# Patient Record
Sex: MALE | HISPANIC OR LATINO | Employment: OTHER | URBAN - METROPOLITAN AREA
[De-identification: names, ages, dates, MRNs, and addresses within clinical notes are randomized per-mention and may not be internally consistent; named-entity substitution may affect disease eponyms.]

---

## 2017-02-01 ENCOUNTER — CLINICAL SUPPORT (OUTPATIENT)
Dept: SPEECH THERAPY | Facility: HOSPITAL | Age: 32
End: 2017-02-01
Attending: ORTHOPAEDIC SURGERY
Payer: MEDICAID

## 2017-02-01 DIAGNOSIS — R53.1 WEAKNESS: ICD-10-CM

## 2017-02-01 DIAGNOSIS — M79.602 PAIN OF LEFT UPPER EXTREMITY: ICD-10-CM

## 2017-02-01 PROCEDURE — 97165 OT EVAL LOW COMPLEX 30 MIN: CPT

## 2017-02-01 NOTE — PLAN OF CARE
TIME RECORD    Date: 2017    Start Time:  300  Stop Time:  400    PROCEDURES:    TIMED  Procedure Min.                 UNTIMED  Procedure Min.   IE 60         Total Timed Minutes:  -  Total Timed Units:  -  Total Untimed Units:  1  Charges Billed/# of units:  LCE1    OCCUPATIONAL THERAPY INITIAL EVALUATION & PLAN OF TREATMENT    Patient Name: Alfie Holly  Physician Name:  Collins Dow  Primary Diagnosis:  L shoulder dislocation  Treatment Diagnosis:  Pain in limb, weakness, instability  Onset Date:  1 month ago  Eval Date:  2017  Certification Period:  2017 to 2017  Past Medical History: No past medical history on file.  Precautions:  universal  Prior Therapy:  None for shoulder  Signs of Abuse: no  Medications: Alfie Holly currently has no medications in their medication list.  Nutrition:  Good  Social Cultural Assessment:  Doesn't work  Prior Level of Function: Independent  Social History:  Lives with Dad  Place of Residence (steps/adaptations):  N/A  Functional Deficits Leading to Referral/Nature of Injury:  Pt reports h/o multiple dislocations LUE however the least time he was reaching overhead to pull shirt off to take a shower and dislocated L shoulder and couldn't get it to go back in had to go to the ER; Pt states he thinks this started when he was younger and he was bocing and he went to hit with is LUE and felt a pop. With increased incidence of dislocation after that.    Patient Therapy Goals:  To decrease pain and increase functional use LUE  Hand dominance: Right  X-Rays/Tests: There is left anterior glenohumeral dislocation which appears reduced on final transscapular Y-view. Hill-Sachs lesion noted. Acromioclavicular joint is maintained.    Subjective:  Pain:  During no work: 0/10  While workin/10  Sleepin/10  Location of pain:anterior shoulder, Biceps tendon    Objective:  Sensation Test: Patient denies any  numbness/tingling    Observation/Inspection:normal muscle tone for age    Range of Motion:   Shoulder  Right   Left  Pain/Dysfunction with Movement    AROM PROM MMT AROM PROM MMT    flexion WNL WNL  160 5/5 *pain   extension WNL WNL WNL 65 WNL 5/5    abduction WNL WNL  160 5/5 *pain   adduction WNL WNL WNL 45 N/T 5/5 *pain   Internal rotation WNL WNL WNL L4 85 4+/5 *pain   ER at 90° abd WNL WNL WNL 70 25 N/T *pain/guarded during PROM   ER at 0° abd WNL WNL WNL 50 30 5/5    Elbow flex WNL WNL WNL WNL WNL 5/5      ROM Comments: Pain at end range    Painful Arc: Patient demonstrates no painful arc in shoulder flexion or abduction    Special Tests:  Positive: Int. Rot. and ADD. Impingement and Bicep/yergason's Test  Negative: Empty can test    Palpation: (for pain)     Positive: Anterior Subacromial Space and Bicipital Groove    Limitations of Functional Status:   Self Care: requires increase time to don clothes and reaching overhead  Work: N/A  Leisure: unable to fish    Test: Patient scored 37% on fOTO shoulder survey demonstrating Pt's functional ability with upper extremity.     Treatment included: OT evaluation, the following exercises (HEP) were instructed and Alfie was able to demonstrate them prior to the end of the session. HEP are as follows: see attached in media     Assessment  This 31 y.o. male referred to Outpatient Occupational Therapy with diagnosis of   Encounter Diagnoses   Name Primary?    Pain of left upper extremity     Weakness     presents with limitations as described in problem list. Patient can benefit from Occupational Therapy services for Ultrasound, moist heat, PROM, AAROM, AROM, Theraputic exercises, joint mobs, home exercise program provied with written instructions, ice and strengthening. The following goals were discussed with the patient and he is in agreement with them as to be addressed in the treatment plan.     History Examination Decision Making Complexity Score    Occupational Profile: Did an brief chart review      Pt is a 32 yo male who lives with Dad, does not work, drives    Medical and Therapy History:     Comorbidities that may affect POC include: None noted          Low   Performance Deficits   Nondominant UE affected    Physical  Decreased function of Left UE, Decreased ROM, Increased pain, Decreased strength, Hypomobility, Inability to perform leisure activiites and Inability to perform self care tasks    Cognitive  N/A      Psychosocial:    Pt unable to perform the following requires increase time to don clothes and reaching overhead, unable to fish all of which were a part of pt's habits, roles, routines, interpersonal interactions prior to injury/surgey     Moderate Foto score:37%    Pt has several treatment options including ASTYM, IASTM, cupping, soft tissue mobs, joint mobs, therex, therapeutic activity, education, endurance training, modalities etc.      Discussed goals and Pt in agreement with goals.    Issued HEP at eval and pt able to perform all with minimal verbal and tactile cues provided by OT. Pt able to complete all without c/o and demonstrating good technique    Low Low     Rehab Potential: good    Goals to be met in 4 weeks: (3/2/17)  1) Initiate Hep   2) Pt will increase L shoulder AROM by 10 degrees grossly for improved performance with overhead ADL's  3) Pt will report 1/10 pain in (L)shoulder at worst  4) Pt will demonstrate increased MMT to 5/5 grossly L shoulder  5) Patient will be able to achieve less than or equal to 25% demonstrating overall improved functional ability with upper extremity.     Goals to be met by discharge:  1) Independent with HEP  2) Pt will demonstrate (L) shoulder AROM WNL grossly for [place] with ADL's  3) Pt will demonstrate (L) shoulder MMT WNL grossly for [place] with functional activities  4) Independent and pain free with ADL's and IADL's  5) Patient will be able to achieve less than or equal to 19%  demonstrating overall improved functional ability with upper extremity.     Plan  Recommended Treatment Plan (2 times per week for 8 weeks): Therapeutic Exercise, Functional Activities, Patient Education, Home Exercise Program, Ultrasound/Phonophoresis, Electrical Stimulation/TENS/Interferential, Moist Heat/Ice and Manual Therapy  Other Recommendations:  KT/ASTYM/cupping focus of stabilization therex.     Therapist's Name: Angy Mary CAMPBELL/RYLEY   Date: 02/01/2017    I CERTIFY THE NEED FOR THESE SERVICES FURNISHED UNDER THIS PLAN OF TREATMENT AND WHILE UNDER MY CARE    Physician's comments: ________________________________________________________________________________________________________________________________________________      Physician's Name: ___________________________________    There were no vitals taken for this visit.    I certify the need for these services furnished under this plan of treatment and while under my care.  Collins Dow MD

## 2017-02-06 ENCOUNTER — HOSPITAL ENCOUNTER (EMERGENCY)
Facility: HOSPITAL | Age: 32
Discharge: HOME | End: 2017-02-06
Attending: EMERGENCY MEDICINE
Payer: MEDICAID

## 2017-02-06 ENCOUNTER — CLINICAL SUPPORT (OUTPATIENT)
Dept: SPEECH THERAPY | Facility: HOSPITAL | Age: 32
End: 2017-02-06
Attending: ORTHOPAEDIC SURGERY
Payer: MEDICAID

## 2017-02-06 VITALS
HEIGHT: 67 IN | RESPIRATION RATE: 18 BRPM | OXYGEN SATURATION: 100 % | HEART RATE: 86 BPM | TEMPERATURE: 98 F | WEIGHT: 150 LBS | DIASTOLIC BLOOD PRESSURE: 96 MMHG | SYSTOLIC BLOOD PRESSURE: 139 MMHG | BODY MASS INDEX: 23.54 KG/M2

## 2017-02-06 DIAGNOSIS — R53.1 WEAKNESS: ICD-10-CM

## 2017-02-06 DIAGNOSIS — M79.602 PAIN OF LEFT UPPER EXTREMITY: ICD-10-CM

## 2017-02-06 DIAGNOSIS — J32.1 FRONTAL SINUSITIS, UNSPECIFIED CHRONICITY: Primary | ICD-10-CM

## 2017-02-06 PROCEDURE — 93005 ELECTROCARDIOGRAM TRACING: CPT

## 2017-02-06 PROCEDURE — 99283 EMERGENCY DEPT VISIT LOW MDM: CPT

## 2017-02-06 PROCEDURE — 93010 ELECTROCARDIOGRAM REPORT: CPT | Mod: ,,, | Performed by: INTERNAL MEDICINE

## 2017-02-06 PROCEDURE — 25000003 PHARM REV CODE 250: Performed by: EMERGENCY MEDICINE

## 2017-02-06 RX ORDER — IBUPROFEN 400 MG/1
800 TABLET ORAL
Status: COMPLETED | OUTPATIENT
Start: 2017-02-06 | End: 2017-02-06

## 2017-02-06 RX ORDER — AMOXICILLIN AND CLAVULANATE POTASSIUM 875; 125 MG/1; MG/1
1 TABLET, FILM COATED ORAL 2 TIMES DAILY
Qty: 14 TABLET | Refills: 0 | Status: SHIPPED | OUTPATIENT
Start: 2017-02-06 | End: 2017-02-16

## 2017-02-06 RX ORDER — LORATADINE 10 MG/1
10 TABLET ORAL DAILY
Qty: 60 TABLET | Refills: 0 | Status: SHIPPED | OUTPATIENT
Start: 2017-02-06 | End: 2018-02-06

## 2017-02-06 RX ORDER — IBUPROFEN 800 MG/1
800 TABLET ORAL EVERY 6 HOURS PRN
Qty: 20 TABLET | Refills: 0 | Status: SHIPPED | OUTPATIENT
Start: 2017-02-06 | End: 2017-10-21

## 2017-02-06 RX ORDER — AMOXICILLIN AND CLAVULANATE POTASSIUM 875; 125 MG/1; MG/1
1 TABLET, FILM COATED ORAL
Status: COMPLETED | OUTPATIENT
Start: 2017-02-06 | End: 2017-02-06

## 2017-02-06 RX ORDER — FLUTICASONE PROPIONATE 50 MCG
2 SPRAY, SUSPENSION (ML) NASAL 2 TIMES DAILY PRN
Qty: 15 G | Refills: 0 | Status: SHIPPED | OUTPATIENT
Start: 2017-02-06

## 2017-02-06 RX ADMIN — AMOXICILLIN AND CLAVULANATE POTASSIUM 1 TABLET: 875; 125 TABLET, FILM COATED ORAL at 07:02

## 2017-02-06 RX ADMIN — IBUPROFEN 800 MG: 400 TABLET, FILM COATED ORAL at 07:02

## 2017-02-06 NOTE — PROGRESS NOTES
Pt on UBE for/rev @120 x 6 min prior to session. After UBE pt stated his BP was running high earlier and asked therapist to take BP. BP taken initially with pt laying on mat: 152/87. Allowed pt to rest for 5 minutes and took BP again this time with pt sitting up: 138/94. LPN Ema Huntley and PTA Wild Neves present as well. Held off on therapy this date secondary to high blood pressure. Instructed pt to follow up with MD regarding high BP and detailed instructions provided in [place] by Wild Neves explaining any symptoms that would require pt to go to ED. Pt verbalized understanding. No charges indicated this date.

## 2017-02-07 DIAGNOSIS — R51.9 HEADACHE: Primary | ICD-10-CM

## 2017-02-07 NOTE — DISCHARGE INSTRUCTIONS
Understanding Sinus Problems    You dont often think about your sinuses until theres a problem. One day you realize you cant smell dinner cooking. Or you find you often have headaches or problems breathing through your nose.  Symptoms of sinus problems  Sinus problems can cause uncomfortable symptoms. Your nose may run constantly. You might have trouble sleeping at night. You may even lose your sense of smell. Other symptoms can include:  · Nasal congestion  · Fullness in ears  · Green, yellow, or bloody drainage from the nose  · Trouble tasting food  · Frequent headaches  · Facial pain  · Cough  When sinuses are blocked  If something blocks the passages in the nose or sinuses, mucus cant drain. Mucus-filled sinuses often become infected.  · Colds cause the lining of the nose and sinuses to swell and make extra mucus. A buildup of mucus can lead to a more serious infection.  · Allergies irritate turbinates and other tissues. This causes swelling, which can cause a blockage. Over time, this irritation can also lead to sacs of swollen tissue (polyps).  · Polyps may form in both the sinuses and nose. Polyps can grow large enough to clog nasal passages and block drainage.  · A crooked (deviated) septum may block nasal passages. This is often the result of an injury.  Date Last Reviewed: 11/1/2016  © 5803-2104 [employer]. [address] [place] [address], [place] [address]. All rights reserved. This information is not intended as a substitute for professional medical care. Always follow your healthcare professional's instructions.          Understanding Nasal Anatomy: Inside View  A lot happens under the surface of the nose. The bone and cartilage under the skin give the nose most of its size and shape. Other structures inside and behind the nose help you breathe. Learning the anatomy of the nose can help you better understand how the nose works.       Bone supports the upper third (bridge) of the nose. The  "upper cartilage supports the side of the nose. The lower cartilage adds support, width, and height. It helps shape the nostrils and the tip of the nose. Skin also helps shape the nose.          The nasal cavity is a hollow space behind the nose that air flows through. The septum is a thin "wall" made of cartilage and bone. It divides the inside of the nose into two chambers. The mucous membrane is thin tissue that lines the nose, sinuses, and throat. It warms and moistens the air you breathe in. It also makes the sticky mucus that helps clean the air of dust and other small particles. The turbinates on each side of the nose are curved, bony ridges lined with mucous membrane. They warm and moisten the air you breathe in. The sinuses are hollow, air-filled chambers in the bone around your nose. Mucus from the sinuses drains into the nasal cavity.  Date Last Reviewed: 11/1/2016  © 7397-3025 [employer]. [address] [place] [address], [place] [address]. All rights reserved. This information is not intended as a substitute for professional medical care. Always follow your healthcare professional's instructions.        "

## 2017-02-07 NOTE — ED TRIAGE NOTES
PT WENT TO DENTIST TODAY TOLD HE HAD HIGH BLOOD PRESSURE, BOTTOM NUMBER TOO HIGH, SO PT CAME TO ER.DENIES CHEST PAIN OR SOB. NO NECK OR JAW PAIN, NO LT ARM PAIN. DENIES VISUAL DISTURBANCE.

## 2017-02-07 NOTE — ED PROVIDER NOTES
Encounter Date: 2/6/2017       History     Chief Complaint   Patient presents with    Headache     headache and dizziness since yesterday     Review of patient's allergies indicates:  No Known Allergies  HPI Comments: Patient comes with one day of frontal sinus pressure, congestion and sore throat. No neck pain or ear pain. No fevers.     The history is provided by the patient.     History reviewed. No pertinent past medical history.  No past medical history pertinent negatives.  History reviewed. No pertinent past surgical history.  History reviewed. No pertinent family history.  Social History   Substance Use Topics    Smoking status: Never Smoker    Smokeless tobacco: None    Alcohol use No     Review of Systems   Constitutional: Negative for chills, fatigue and fever.   HENT: Positive for congestion and postnasal drip. Negative for sore throat.    Respiratory: Negative for cough, choking, chest tightness and shortness of breath.    Cardiovascular: Negative for chest pain, palpitations and leg swelling.   Gastrointestinal: Negative for abdominal distention, abdominal pain, diarrhea, nausea and vomiting.   Genitourinary: Negative for dysuria.   Musculoskeletal: Negative for back pain, neck pain and neck stiffness.   Skin: Negative for rash.   Neurological: Positive for headaches. Negative for weakness.   Hematological: Does not bruise/bleed easily.   All other systems reviewed and are negative.      Physical Exam   Initial Vitals   BP Pulse Resp Temp SpO2   02/06/17 1701 02/06/17 1701 02/06/17 1701 02/06/17 1701 02/06/17 1701   150/90 86 18 97.6 °F (36.4 °C) 96 %     Physical Exam    Nursing note and vitals reviewed.  Constitutional: Vital signs are normal. He appears well-developed and well-nourished. No distress.   HENT:   Head: Normocephalic and atraumatic.   Bilateral TM erythematous, OP erythematous without edema or exudate.    Eyes: EOM are normal. Pupils are equal, round, and reactive to light.   Neck:  Normal range of motion. Neck supple.   Cardiovascular: Normal rate and regular rhythm.   Pulmonary/Chest: Breath sounds normal. No respiratory distress. He has no wheezes. He has no rhonchi. He has no rales. He exhibits no tenderness.   Abdominal: Soft. He exhibits no distension. There is no tenderness. There is no rebound and no guarding.   Musculoskeletal: Normal range of motion. He exhibits no tenderness.   Neurological: He is alert and oriented to person, place, and time. No cranial nerve deficit.   Skin: Skin is warm and dry.   Psychiatric: He has a normal mood and affect.         ED Course   Procedures  Labs Reviewed - No data to display                            ED Course     Clinical Impression:   The encounter diagnosis was Frontal sinusitis, unspecified chronicity.    Disposition:   Disposition: Discharged  Condition: Stable       Aryan Gonzales MD  02/07/17 0022

## 2017-02-08 ENCOUNTER — CLINICAL SUPPORT (OUTPATIENT)
Dept: SPEECH THERAPY | Facility: HOSPITAL | Age: 32
End: 2017-02-08
Attending: ORTHOPAEDIC SURGERY
Payer: MEDICAID

## 2017-02-08 DIAGNOSIS — R53.1 WEAKNESS: ICD-10-CM

## 2017-02-08 DIAGNOSIS — M79.602 PAIN OF LEFT UPPER EXTREMITY: ICD-10-CM

## 2017-02-08 PROCEDURE — 97530 THERAPEUTIC ACTIVITIES: CPT

## 2017-02-08 NOTE — PROGRESS NOTES
"TIME RECORD     Date: 02/08/2017     Start Time:455  Stop Time: 545     PROCEDURES:     TIMED  Procedure Min.    MT 10     TE  40       UNTIMED  Procedure Min.                Total Timed Minutes: 50  Total Timed Units: 3  Total Untimed Units: -  Charges Billed/# of units:TA3     OCCUPATIONAL THERAPY PROGRESS NOTE     Patient Name: Alfie Holly  Physician Name: Collins Dow  Primary Diagnosis: L shoulder dislocation  Treatment Diagnosis: Pain in limb, weakness, instability  Onset Date: 1 month ago  Eval Date: 02/01/2017  Certification Period: 02/01/2017 to 4/1/2017  Past Medical History: No past medical history on file.  Precautions: universal  Prior Therapy: None for shoulder  Signs of Abuse: no  Medications: Alfie Holly currently has no medications in their medication list.  Nutrition: Good  Social Cultural Assessment: Doesn't work  Prior Level of Function: Independent  Social History: Lives with Dad  Place of Residence (steps/adaptations): N/A  Functional Deficits Leading to Referral/Nature of Injury: Pt reports h/o multiple dislocations LUE however the least time he was reaching overhead to pull shirt off to take a shower and dislocated L shoulder and couldn't get it to go back in had to go to the ER; Pt states he thinks this started when he was younger and he was bocing and he went to hit with is LUE and felt a pop. With increased incidence of dislocation after that.   Patient Therapy Goals: To decrease pain and increase functional use LUE  Hand dominance: Right  X-Rays/Tests: There is left anterior glenohumeral dislocation which appears reduced on final transscapular Y-view. Hill-Sachs lesion noted. Acromioclavicular joint is maintained.     Subjective:     Pain: 2 /10  "I feel better than I did last time."    Objective:     Pt on UBE for/rev @120rpms 6 minutes prior to session. MT x 15 consisting of patient supine for L shoulder lateral telescoping, UT STM, pec lift, subscapularis MFR and " "stretch, PROM with endrange stretching, GHJ inferior anterior posterior glides grade I-III, gentle shoulder oscillations. Pt participated in OT therex for ROM,  stabilization and strengthening LUE per tx log:  Exercises Date 02/08/2017    Visit #2   PROM (L) Shoulder FLEX/ABD/IR/ER 10x   Supine dowel Flex 5  2/15   SL ABD 2  2/15   SL ER 2  2/15       Sleeper Stretch 3/30"   IR pulley stretch 3/30"   Ball on wall stab 30x ea dir   Tband Lats blue  2/15   Tband Rows blue  2/15   Tband IR/ER blue  2/15   Body blade 30"x 2 ea dir     Declined CP this date.     Assessment:     Pt participated well this date. Able to complete all therex in a pain free range and demonstrating good technique. minimal clicking noted however decreased after cueing for posture. Tolerated therex well with minimal fatigue noted after. Pt motivated. Good candidate for OT. Pt would continue to benefit from skilled OT to address deficits.     Patient Education/Response:     Cont with HEP    Plans and Goals:     Cont with OT POC    Goals to be met in 4 weeks: (3/2/17)  1) Initiate Hep   2) Pt will increase L shoulder AROM by 10 degrees grossly for improved performance with overhead ADL's  3) Pt will report 1/10 pain in (L)shoulder at worst  4) Pt will demonstrate increased MMT to 5/5 grossly L shoulder  5) Patient will be able to achieve less than or equal to 25% demonstrating overall improved functional ability with upper extremity.      Goals to be met by discharge:  1) Independent with HEP  2) Pt will demonstrate (L) shoulder AROM WNL grossly for [place] with ADL's  3) Pt will demonstrate (L) shoulder MMT WNL grossly for [place] with functional activities  4) Independent and pain free with ADL's and IADL's  5) Patient will be able to achieve less than or equal to 19% demonstrating overall improved functional ability with upper extremity.        "

## 2017-02-13 NOTE — ED AVS SNAPSHOT
[facility]  [address]  [place] [address]               Alfie Holly   2017  6:05 PM   ED    Description:  Male : 1985   Department:  [facility]           Your Care was Coordinated By:     Provider Role From To    Aryan Gonzales MD Attending Provider 17 1910 --      Reason for Visit     Headache           Diagnoses this Visit        Comments    Frontal sinusitis, unspecified chronicity    -  Primary       ED Disposition     None           To Do List           Follow-up Information     Follow up with [facility] - FAMILY MEDICINE In 2 days.    Specialty:  Family Medicine    Contact information:    [address] [address]  [phone]         These Medications        Disp Refills Start End    amoxicillin-clavulanate 875-125mg (AUGMENTIN) 875-125 mg per tablet 14 tablet 0 2017    Take 1 tablet by mouth 2 (two) times daily. - Oral    ibuprofen (ADVIL,MOTRIN) 800 MG tablet 20 tablet 0 2017     Take 1 tablet (800 mg total) by mouth every 6 (six) hours as needed for Pain. - Oral    fluticasone (FLONASE) 50 mcg/actuation nasal spray 15 g 0 2017     2 sprays by Each Nare route 2 (two) times daily as needed. - Each Nare    loratadine (CLARITIN) 10 mg tablet 60 tablet 0 2017    Take 1 tablet (10 mg total) by mouth once daily. - Oral      [facility]     [facility] -  Assistance  Registered nurses in the [facility] provide clinical advisement, health education, appointment booking, and other advisory services.  Call for this free service at [phone].             Medications           Message regarding Medications     Verify the changes and/or additions to your medication regime listed below are the same as discussed with your clinician today.  If any of these changes or additions are incorrect, please notify your  H/O: pituitary tumor  Removed in 2014  Obesity  s/p Lap band 2009  S/P lumbar laminectomy  with fusion - L4 - L5 2005, 2006 and 2008 healthcare provider.        START taking these NEW medications        Refills    amoxicillin-clavulanate 875-125mg (AUGMENTIN) 875-125 mg per tablet 0    Sig: Take 1 tablet by mouth 2 (two) times daily.    Class: Print    Route: Oral    ibuprofen (ADVIL,MOTRIN) 800 MG tablet 0    Sig: Take 1 tablet (800 mg total) by mouth every 6 (six) hours as needed for Pain.    Class: Print    Route: Oral    fluticasone (FLONASE) 50 mcg/actuation nasal spray 0    Si sprays by Each Nare route 2 (two) times daily as needed.    Class: Print    Route: Each Nare    loratadine (CLARITIN) 10 mg tablet 0    Sig: Take 1 tablet (10 mg total) by mouth once daily.    Class: Print    Route: Oral      These medications were administered today        Dose Freq    ibuprofen tablet 800 mg 800 mg ED 1 Time    Sig: Take 2 tablets (800 mg total) by mouth ED 1 Time.    Class: Normal    Route: Oral    amoxicillin-clavulanate 875-125mg per tablet 1 tablet 1 tablet ED 1 Time    Sig: Take 1 tablet by mouth ED 1 Time.    Class: Normal    Route: Oral           Verify that the below list of medications is an accurate representation of the medications you are currently taking.  If none reported, the list may be blank. If incorrect, please contact your healthcare provider. Carry this list with you in case of emergency.           Current Medications     amoxicillin-clavulanate 875-125mg (AUGMENTIN) 875-125 mg per tablet Take 1 tablet by mouth 2 (two) times daily.    amoxicillin-clavulanate 875-125mg per tablet 1 tablet Take 1 tablet by mouth ED 1 Time.    fluticasone (FLONASE) 50 mcg/actuation nasal spray 2 sprays by Each Nare route 2 (two) times daily as needed.    ibuprofen (ADVIL,MOTRIN) 800 MG tablet Take 1 tablet (800 mg total) by mouth every 6 (six) hours as needed for Pain.    ibuprofen tablet 800 mg Take 2 tablets (800 mg total) by mouth ED 1 Time.    loratadine (CLARITIN) 10 mg tablet Take 1 tablet (10 mg total) by mouth once daily.          "  Clinical Reference Information           Your Vitals Were     BP Pulse Temp Resp Height Weight    150/90 (BP Location: Right arm, Patient Position: Sitting) 86 97.6 °F (36.4 °C) (Oral) 18 5' 7" (1.702 m) 68 kg (150 lb)    SpO2 BMI             96% 23.49 kg/m2         Allergies as of 2/6/2017     No Known Allergies      Immunizations Administered on Date of Encounter - 2/6/2017     None      ED Micro, Lab, POCT     None      ED Imaging Orders     None        Discharge Instructions         Understanding Sinus Problems    You dont often think about your sinuses until theres a problem. One day you realize you cant smell dinner cooking. Or you find you often have headaches or problems breathing through your nose.  Symptoms of sinus problems  Sinus problems can cause uncomfortable symptoms. Your nose may run constantly. You might have trouble sleeping at night. You may even lose your sense of smell. Other symptoms can include:  · Nasal congestion  · Fullness in ears  · Green, yellow, or bloody drainage from the nose  · Trouble tasting food  · Frequent headaches  · Facial pain  · Cough  When sinuses are blocked  If something blocks the passages in the nose or sinuses, mucus cant drain. Mucus-filled sinuses often become infected.  · Colds cause the lining of the nose and sinuses to swell and make extra mucus. A buildup of mucus can lead to a more serious infection.  · Allergies irritate turbinates and other tissues. This causes swelling, which can cause a blockage. Over time, this irritation can also lead to sacs of swollen tissue (polyps).  · Polyps may form in both the sinuses and nose. Polyps can grow large enough to clog nasal passages and block drainage.  · A crooked (deviated) septum may block nasal passages. This is often the result of an injury.  Date Last Reviewed: 11/1/2016  © 3710-2647 [employer]. [address] [place] [address], [place] [address]. All rights reserved. This information is not intended " "as a substitute for professional medical care. Always follow your healthcare professional's instructions.          Understanding Nasal Anatomy: Inside View  A lot happens under the surface of the nose. The bone and cartilage under the skin give the nose most of its size and shape. Other structures inside and behind the nose help you breathe. Learning the anatomy of the nose can help you better understand how the nose works.       Bone supports the upper third (bridge) of the nose. The upper cartilage supports the side of the nose. The lower cartilage adds support, width, and height. It helps shape the nostrils and the tip of the nose. Skin also helps shape the nose.          The nasal cavity is a hollow space behind the nose that air flows through. The septum is a thin "wall" made of cartilage and bone. It divides the inside of the nose into two chambers. The mucous membrane is thin tissue that lines the nose, sinuses, and throat. It warms and moistens the air you breathe in. It also makes the sticky mucus that helps clean the air of dust and other small particles. The turbinates on each side of the nose are curved, bony ridges lined with mucous membrane. They warm and moisten the air you breathe in. The sinuses are hollow, air-filled chambers in the bone around your nose. Mucus from the sinuses drains into the nasal cavity.  Date Last Reviewed: 11/1/2016 © 2000-2016 [employer]. [address] [place] [address]. All rights reserved. This information is not intended as a substitute for professional medical care. Always follow your healthcare professional's instructions.          Your Scheduled Appointments     Feb 08, 2017  5:00 PM CST   Established Occupational Therapy with SJ Walter   [facility] [address] [place] [address]   [phone]            Feb 15, 2017  5:00 PM CST   Established Occupational Therapy with Angy" Mary ANDREASTHERESA   [facility]    [address]  [place] [address]   [phone]            Feb 20, 2017  5:00 PM CST   Established Occupational Therapy with Anyg MarySJ   [facility] ([facility])    [address]  [place] [address]   [phone]              [facility] Sign-Up     Activating your [facility] account is as easy as 1-2-3!     1) Visit [email or web address], select Sign Up Now, enter this activation code and your date of birth, then select Next.  [email or web address]  Expires: 2/7/2017  3:38 PM      2) Create a username and password to use when you visit [facility] in the future and select a security question in case you lose your password and select Next.    3) Enter your e-mail address and click Sign Up!    Additional Information  If you have questions, please e-mail [email or web address] or call [phone] to talk to our [facility] staff. Remember, [facility] is NOT to be used for urgent needs. For medical emergencies, dial 911.          [facility] complies with applicable Federal civil rights laws and does not discriminate on the basis of race, color, national origin, age, disability, or sex.        Language Assistance Services     ATTENTION: Language assistance services are available, free of charge. Please call [phone].      ATENCIÓN: Si habla bart, tiene a araujo disposición servicios gratuitos de asistencia lingüística. Llame al [phone].     CHÚ Ý: N?u b?n nói Ti?ng Vi?t, có các d?ch v? h? tr? ngôn ng? mi?n phí dành cho b?n. G?i s? [phone].

## 2017-02-15 ENCOUNTER — CLINICAL SUPPORT (OUTPATIENT)
Dept: REHABILITATION | Facility: HOSPITAL | Age: 32
End: 2017-02-15
Attending: ORTHOPAEDIC SURGERY
Payer: MEDICAID

## 2017-02-15 DIAGNOSIS — R53.1 WEAKNESS: ICD-10-CM

## 2017-02-15 DIAGNOSIS — M79.602 PAIN OF LEFT UPPER EXTREMITY: ICD-10-CM

## 2017-02-15 PROCEDURE — 97530 THERAPEUTIC ACTIVITIES: CPT | Mod: PN

## 2017-02-15 NOTE — PROGRESS NOTES
"TIME RECORD     Date:02/15/2017       Start Time: 435  Stop Time: 535     PROCEDURES:     TIMED  Procedure Min.    MT 10    Sup TE 25 N/C    TE 25       UNTIMED  Procedure Min.                Total Timed Minutes: 35  Total Timed Units: 2  Total Untimed Units: -  Charges Billed/# of units:TA2     OCCUPATIONAL THERAPY PROGRESS NOTE     Patient Name: Alfie Holly  Physician Name: Collins Dow  Primary Diagnosis: L shoulder dislocation  Treatment Diagnosis: Pain in limb, weakness, instability  Onset Date: 1 month ago  Eval Date: 02/01/2017  Certification Period: 02/01/2017 to 4/1/2017  Past Medical History: No past medical history on file.  Precautions: universal  Prior Therapy: None for shoulder  Signs of Abuse: no  Medications: Alfie Holly currently has no medications in their medication list.  Nutrition: Good  Social Cultural Assessment: Doesn't work  Prior Level of Function: Independent  Social History: Lives with Dad  Place of Residence (steps/adaptations): N/A  Functional Deficits Leading to Referral/Nature of Injury: Pt reports h/o multiple dislocations LUE however the least time he was reaching overhead to pull shirt off to take a shower and dislocated L shoulder and couldn't get it to go back in had to go to the ER; Pt states he thinks this started when he was younger and he was bocing and he went to hit with is LUE and felt a pop. With increased incidence of dislocation after that.   Patient Therapy Goals: To decrease pain and increase functional use LUE  Hand dominance: Right  X-Rays/Tests: There is left anterior glenohumeral dislocation which appears reduced on final transscapular Y-view. Hill-Sachs lesion noted. Acromioclavicular joint is maintained.     Subjective:     Pain:3-4 /10  "I have a little pain today."    Objective:     Pt on UBE for/rev @120rpms 6 minutes prior to session. MT x 15 consisting of patient supine for L shoulder lateral telescoping, UT STM, pec lift, " "subscapularis MFR and stretch, PROM with endrange stretching, GHJ inferior anterior posterior glides grade I-III, gentle shoulder oscillations. Pt participated in OT therex for ROM,  stabilization and strengthening LUE per tx log:  Exercises Date 02/15/2017    Visit #3   PROM (L) Shoulder FLEX/ABD/IR/ER 10x   Supine dowel Flex 5  2/15   SL ABD 2  2/15   SL ER 2  2/15       Sleeper Stretch 3/30"   IR pulley stretch 3/30"   Ball on wall stab 30x ea dir   Tband Lats blue  2/15   Tband Rows blue  2/15   Tband IR/ER blue  2/15   Body blade 30"x 2 ea dir     Declined CP this date.     Assessment:     Pt participated well this date. Able to complete all therex in a pain free range and demonstrating good technique. No clicking noted this date. Tolerated therex well with minimal fatigue noted after. Pt motivated. Good candidate for OT, progressing well. Pt would continue to benefit from skilled OT to address deficits.     Patient Education/Response:     Cont with HEP     Plans and Goals:     Cont with OT POC    Goals to be met in 4 weeks: (3/2/17)  1) Initiate Hep   2) Pt will increase L shoulder AROM by 10 degrees grossly for improved performance with overhead ADL's  3) Pt will report 1/10 pain in (L)shoulder at worst  4) Pt will demonstrate increased MMT to 5/5 grossly L shoulder  5) Patient will be able to achieve less than or equal to 25% demonstrating overall improved functional ability with upper extremity.      Goals to be met by discharge:  1) Independent with HEP  2) Pt will demonstrate (L) shoulder AROM WNL grossly for [place] with ADL's  3) Pt will demonstrate (L) shoulder MMT WNL grossly for [place] with functional activities  4) Independent and pain free with ADL's and IADL's  5) Patient will be able to achieve less than or equal to 19% demonstrating overall improved functional ability with upper extremity.        "

## 2017-02-20 ENCOUNTER — CLINICAL SUPPORT (OUTPATIENT)
Dept: REHABILITATION | Facility: HOSPITAL | Age: 32
End: 2017-02-20
Attending: ORTHOPAEDIC SURGERY
Payer: MEDICAID

## 2017-02-20 DIAGNOSIS — M79.602 PAIN OF LEFT UPPER EXTREMITY: ICD-10-CM

## 2017-02-20 DIAGNOSIS — R53.1 WEAKNESS: ICD-10-CM

## 2017-02-20 PROCEDURE — 97530 THERAPEUTIC ACTIVITIES: CPT | Mod: PN

## 2017-02-20 NOTE — PROGRESS NOTES
"TIME RECORD     Date:02/20/2017       Start Time: 445  Stop Time: 535     PROCEDURES:     TIMED  Procedure Min.    MT 10         TE 40       UNTIMED  Procedure Min.                Total Timed Minutes: 50  Total Timed Units: 3  Total Untimed Units: -  Charges Billed/# of units:TA3     OCCUPATIONAL THERAPY PROGRESS NOTE     Patient Name: Alfie Holly  Physician Name: Collins Dow  Primary Diagnosis: L shoulder dislocation  Treatment Diagnosis: Pain in limb, weakness, instability  Onset Date: 1 month ago  Eval Date: 02/01/2017  Certification Period: 02/01/2017 to 4/1/2017  Past Medical History: No past medical history on file.  Precautions: universal  Prior Therapy: None for shoulder  Signs of Abuse: no  Medications: Alfie Holly currently has no medications in their medication list.  Nutrition: Good  Social Cultural Assessment: Doesn't work  Prior Level of Function: Independent  Social History: Lives with Dad  Place of Residence (steps/adaptations): N/A  Functional Deficits Leading to Referral/Nature of Injury: Pt reports h/o multiple dislocations LUE however the least time he was reaching overhead to pull shirt off to take a shower and dislocated L shoulder and couldn't get it to go back in had to go to the ER; Pt states he thinks this started when he was younger and he was bocing and he went to hit with is LUE and felt a pop. With increased incidence of dislocation after that.   Patient Therapy Goals: To decrease pain and increase functional use LUE  Hand dominance: Right  X-Rays/Tests: There is left anterior glenohumeral dislocation which appears reduced on final transscapular Y-view. Hill-Sachs lesion noted. Acromioclavicular joint is maintained.     Subjective:     Pain:3/10  "I feel better today."    Objective:     Pt on UBE for/rev @120rpms 6 minutes prior to session. MT x 10 consisting of patient supine for L shoulder lateral telescoping, UT STM, pec lift, subscapularis MFR and stretch, " "PROM with endrange stretching, GHJ inferior anterior posterior glides grade I-III, gentle shoulder oscillations. Pt participated in OT therex for ROM,  stabilization and strengthening LUE per tx log:  Exercises Date 02/20/2017    Visit #4 FOTO next session   PROM (L) Shoulder FLEX/ABD/IR/ER 10x   Supine dowel Flex 5  2/15   SL ABD 3  2/15   SL ER 3  2/15       Sleeper Stretch 3/30"   IR pulley stretch 3/30"   Ball on wall stab 30x ea dir   Tband Lats blue  2/15   Tband Rows blue  2/15   Tband IR/ER blue  2/15   Body blade 30"x 2 ea dir   Range of Motion:   Shoulder  Left     AROM   flexion 140(=)   extension 65(=)   abduction 135(+20)   adduction 45(=)   Internal rotation L4(=)   ER at 90° abd 75(+5)   ER at 0° abd 60(+10)   Elbow flex WNL      Declined CP this date.     Assessment:     Pt participated well this date. Able to complete all therex in a pain free range and demonstrating good technique. No clicking noted this date. Increased AROM noted with ER and ABD. Better endurance noted with therex. Pt motivated, progressing well to goals.  Pt would continue to benefit from skilled OT to address deficits.     Patient Education/Response:     Cont with HEP     Plans and Goals:     Cont with OT POC    Goals to be met in 4 weeks: (3/2/17)  1) Initiate Hep   2) Pt will increase L shoulder AROM by 10 degrees grossly for improved performance with overhead ADL's  3) Pt will report 1/10 pain in (L)shoulder at worst  4) Pt will demonstrate increased MMT to 5/5 grossly L shoulder  5) Patient will be able to achieve less than or equal to 25% demonstrating overall improved functional ability with upper extremity.      Goals to be met by discharge:  1) Independent with HEP  2) Pt will demonstrate (L) shoulder AROM WNL grossly for [place] with ADL's  3) Pt will demonstrate (L) shoulder MMT WNL grossly for [place] with functional activities  4) Independent and pain free with ADL's and IADL's  5) Patient will be able to " achieve less than or equal to 19% demonstrating overall improved functional ability with upper extremity.

## 2017-03-06 ENCOUNTER — CLINICAL SUPPORT (OUTPATIENT)
Dept: REHABILITATION | Facility: HOSPITAL | Age: 32
End: 2017-03-06
Attending: ORTHOPAEDIC SURGERY
Payer: MEDICAID

## 2017-03-06 DIAGNOSIS — R53.1 WEAKNESS: ICD-10-CM

## 2017-03-06 DIAGNOSIS — M79.602 PAIN OF LEFT UPPER EXTREMITY: ICD-10-CM

## 2017-03-06 PROCEDURE — 97530 THERAPEUTIC ACTIVITIES: CPT | Mod: PN

## 2017-03-09 ENCOUNTER — HOSPITAL ENCOUNTER (EMERGENCY)
Facility: HOSPITAL | Age: 32
Discharge: HOME | End: 2017-03-09
Attending: EMERGENCY MEDICINE
Payer: MEDICAID

## 2017-03-09 VITALS
HEART RATE: 78 BPM | BODY MASS INDEX: 22.76 KG/M2 | RESPIRATION RATE: 16 BRPM | TEMPERATURE: 99 F | OXYGEN SATURATION: 100 % | HEIGHT: 67 IN | DIASTOLIC BLOOD PRESSURE: 81 MMHG | SYSTOLIC BLOOD PRESSURE: 142 MMHG | WEIGHT: 145 LBS

## 2017-03-09 DIAGNOSIS — R19.7 DIARRHEA, UNSPECIFIED TYPE: ICD-10-CM

## 2017-03-09 DIAGNOSIS — R10.84 GENERALIZED ABDOMINAL PAIN: Primary | ICD-10-CM

## 2017-03-09 LAB
ALBUMIN SERPL BCP-MCNC: 4.4 G/DL
ALP SERPL-CCNC: 70 U/L
ALT SERPL W/O P-5'-P-CCNC: 22 U/L
AMYLASE SERPL-CCNC: 66 U/L
ANION GAP SERPL CALC-SCNC: 10 MMOL/L
AST SERPL-CCNC: 25 U/L
BASOPHILS # BLD AUTO: 0.02 K/UL
BASOPHILS NFR BLD: 0.1 %
BILIRUB SERPL-MCNC: 0.9 MG/DL
BUN SERPL-MCNC: 11 MG/DL
CALCIUM SERPL-MCNC: 9.7 MG/DL
CHLORIDE SERPL-SCNC: 100 MMOL/L
CO2 SERPL-SCNC: 29 MMOL/L
CREAT SERPL-MCNC: 1.1 MG/DL
DIFFERENTIAL METHOD: ABNORMAL
EOSINOPHIL # BLD AUTO: 0.2 K/UL
EOSINOPHIL NFR BLD: 1.1 %
ERYTHROCYTE [DISTWIDTH] IN BLOOD BY AUTOMATED COUNT: 12.9 %
EST. GFR  (AFRICAN AMERICAN): >60 ML/MIN/1.73 M^2
EST. GFR  (NON AFRICAN AMERICAN): >60 ML/MIN/1.73 M^2
FLUAV AG SPEC QL IA: NEGATIVE
FLUBV AG SPEC QL IA: NEGATIVE
GLUCOSE SERPL-MCNC: 81 MG/DL
HCT VFR BLD AUTO: 45.1 %
HGB BLD-MCNC: 15.2 G/DL
LIPASE SERPL-CCNC: 24 U/L
LYMPHOCYTES # BLD AUTO: 2.3 K/UL
LYMPHOCYTES NFR BLD: 16.7 %
MCH RBC QN AUTO: 28.5 PG
MCHC RBC AUTO-ENTMCNC: 33.7 %
MCV RBC AUTO: 85 FL
MONOCYTES # BLD AUTO: 1 K/UL
MONOCYTES NFR BLD: 7.2 %
NEUTROPHILS # BLD AUTO: 10.4 K/UL
NEUTROPHILS NFR BLD: 74.6 %
PLATELET # BLD AUTO: 162 K/UL
PMV BLD AUTO: 11.6 FL
POTASSIUM SERPL-SCNC: 3.7 MMOL/L
PROT SERPL-MCNC: 8.2 G/DL
RBC # BLD AUTO: 5.33 M/UL
SODIUM SERPL-SCNC: 139 MMOL/L
SPECIMEN SOURCE: NORMAL
WBC # BLD AUTO: 13.97 K/UL

## 2017-03-09 PROCEDURE — 85025 COMPLETE CBC W/AUTO DIFF WBC: CPT

## 2017-03-09 PROCEDURE — 83690 ASSAY OF LIPASE: CPT

## 2017-03-09 PROCEDURE — 87400 INFLUENZA A/B EACH AG IA: CPT

## 2017-03-09 PROCEDURE — 82150 ASSAY OF AMYLASE: CPT

## 2017-03-09 PROCEDURE — 80053 COMPREHEN METABOLIC PANEL: CPT

## 2017-03-09 PROCEDURE — 25000003 PHARM REV CODE 250: Performed by: EMERGENCY MEDICINE

## 2017-03-09 PROCEDURE — 99283 EMERGENCY DEPT VISIT LOW MDM: CPT

## 2017-03-09 RX ORDER — SUCRALFATE 1 G/1
1 TABLET ORAL 4 TIMES DAILY PRN
Qty: 20 TABLET | Refills: 0 | Status: SHIPPED | OUTPATIENT
Start: 2017-03-09

## 2017-03-09 RX ORDER — SUCRALFATE 1 G/10ML
1 SUSPENSION ORAL
Status: COMPLETED | OUTPATIENT
Start: 2017-03-09 | End: 2017-03-09

## 2017-03-09 RX ORDER — DICYCLOMINE HYDROCHLORIDE 20 MG/1
20 TABLET ORAL 4 TIMES DAILY PRN
Qty: 20 TABLET | Refills: 0 | Status: SHIPPED | OUTPATIENT
Start: 2017-03-09 | End: 2017-04-08

## 2017-03-09 RX ADMIN — SUCRALFATE 1 G: 1 SUSPENSION ORAL at 07:03

## 2017-03-09 NOTE — ED PROVIDER NOTES
Encounter Date: 3/9/2017       History     Chief Complaint   Patient presents with    Abdominal Pain     accompanied by diarrhea x2 days; denies taking meds pta     Review of patient's allergies indicates:  No Known Allergies  HPI Comments: 31M presents with upper abdominal pain and diarrhea x 2 days.  Associated subjective fever and body aches.  No vomiting.  No known sick contacts.  Pain is constant and severe, rated 8/10.    The history is provided by the patient.     History reviewed. No pertinent past medical history.  History reviewed. No pertinent surgical history.  History reviewed. No pertinent family history.  Social History   Substance Use Topics    Smoking status: Never Smoker    Smokeless tobacco: Never Used    Alcohol use No     Review of Systems   Constitutional: Positive for fever (subjective).   Gastrointestinal: Positive for abdominal pain and diarrhea. Negative for nausea and vomiting.   All other systems reviewed and are negative.      Physical Exam   Initial Vitals   BP Pulse Resp Temp SpO2   03/09/17 1741 03/09/17 1741 03/09/17 1741 03/09/17 1741 03/09/17 1741   136/81 75 16 98.8 °F (37.1 °C) 100 %     Physical Exam    Nursing note and vitals reviewed.  Constitutional: He appears well-developed and well-nourished. No distress.   HENT:   Head: Normocephalic and atraumatic.   Mouth/Throat: Oropharynx is clear and moist.   Eyes: Conjunctivae are normal.   Neck: Normal range of motion.   Cardiovascular: Normal rate, regular rhythm and normal heart sounds.   No murmur heard.  Pulmonary/Chest: Breath sounds normal. He has no wheezes. He has no rhonchi. He has no rales.   Abdominal: Soft. Bowel sounds are normal. He exhibits no distension. There is no tenderness. There is no rebound and no guarding.   Musculoskeletal: Normal range of motion.   Neurological: He is alert and oriented to person, place, and time.   Skin: Skin is warm and dry.   Psychiatric: He has a normal mood and affect. His  behavior is normal.         ED Course   Procedures  Labs Reviewed   CBC W/ AUTO DIFFERENTIAL - Abnormal; Notable for the following:        Result Value    WBC 13.97 (*)     Gran # 10.4 (*)     Gran% 74.6 (*)     Lymph% 16.7 (*)     All other components within normal limits   COMPREHENSIVE METABOLIC PANEL   LIPASE   AMYLASE   INFLUENZA A AND B ANTIGEN             Medical Decision Making:   Clinical Tests:   Lab Tests: Ordered and Reviewed  ED Management:  Abd pain and diarrhea - no fever or peritoneal signs.  Labs show leukocytosis.  No signs of dehydration or electrolyte abnormalities.  Normal LFTs and pancreatic enzymes and no signs of biliary obstruction.    I feel this is likely viral.  Treat symptomatically.                   ED Course     Clinical Impression:   There were no encounter diagnoses.          Yvonne Villa MD  03/09/17 1951

## 2017-03-09 NOTE — ED AVS SNAPSHOT
[facility]  [address]  [place] [address]               Alfie Holly   3/9/2017  5:42 PM   ED    Description:  Male : 1985   Department:  [facility]           Your Care was Coordinated By:     Provider Role From To    Yvonne Villa MD Attending Provider [phone] --      Reason for Visit     Abdominal Pain           Diagnoses this Visit        Comments    Generalized abdominal pain    -  Primary     Diarrhea, unspecified type           ED Disposition     None           To Do List           Follow-up Information     Follow up with [facility].    Specialties:  Child and Adolescent Psychiatry, Psychology, Family Medicine, Obstetrics    Why:  As needed    Contact information:    [address]  [address]  [place] [address]  [phone]         These Medications        Disp Refills Start End    dicyclomine (BENTYL) 20 mg tablet 20 tablet 0 3/9/2017 2017    Take 1 tablet (20 mg total) by mouth 4 (four) times daily as needed (abdominal cramping). - Oral    sucralfate (CARAFATE) 1 gram tablet 20 tablet 0 3/9/2017     Take 1 tablet (1 g total) by mouth 4 (four) times daily as needed (abdominal cramping). - Oral      [facility]     [facility] -  Assistance  Registered nurses in the [facility] provide clinical advisement, health education, appointment booking, and other advisory services.  Call for this free service at [phone].             Medications           Message regarding Medications     Verify the changes and/or additions to your medication regime listed below are the same as discussed with your clinician today.  If any of these changes or additions are incorrect, please notify your healthcare provider.        START taking these NEW medications        Refills    dicyclomine (BENTYL) 20 mg tablet 0    Sig: Take 1 tablet (20 mg total) by mouth 4 (four)  "times daily as needed (abdominal cramping).    Class: Print    Route: Oral    sucralfate (CARAFATE) 1 gram tablet 0    Sig: Take 1 tablet (1 g total) by mouth 4 (four) times daily as needed (abdominal cramping).    Class: Print    Route: Oral      These medications were administered today        Dose Freq    sucralfate 100 mg/mL suspension 1 g 1 g ED 1 Time    Sig: Take 10 mLs (1 g total) by mouth ED 1 Time.    Class: Normal    Route: Oral           Verify that the below list of medications is an accurate representation of the medications you are currently taking.  If none reported, the list may be blank. If incorrect, please contact your healthcare provider. Carry this list with you in case of emergency.           Current Medications     dicyclomine (BENTYL) 20 mg tablet Take 1 tablet (20 mg total) by mouth 4 (four) times daily as needed (abdominal cramping).    fluticasone (FLONASE) 50 mcg/actuation nasal spray 2 sprays by Each Nare route 2 (two) times daily as needed.    ibuprofen (ADVIL,MOTRIN) 800 MG tablet Take 1 tablet (800 mg total) by mouth every 6 (six) hours as needed for Pain.    loratadine (CLARITIN) 10 mg tablet Take 1 tablet (10 mg total) by mouth once daily.    sucralfate (CARAFATE) 1 gram tablet Take 1 tablet (1 g total) by mouth 4 (four) times daily as needed (abdominal cramping).    sucralfate 100 mg/mL suspension 1 g Take 10 mLs (1 g total) by mouth ED 1 Time.           Clinical Reference Information           Your Vitals Were     BP Pulse Temp Resp Height Weight    142/81 (BP Location: Left arm, Patient Position: Standing) 78 98.8 °F (37.1 °C) (Oral) 16 5' 7" (1.702 m) 65.8 kg (145 lb)    SpO2 BMI             100% 22.71 kg/m2         Allergies as of 3/9/2017     No Known Allergies      Immunizations Administered on Date of Encounter - 3/9/2017     None      ED Micro, Lab, POCT     Start Ordered       Status Ordering Provider    03/09/17 1755 03/09/17 175  CBC auto differential  STAT      Final " result     03/09/17 1755 03/09/17 1754  Comprehensive metabolic panel  STAT      Final result     03/09/17 1755 03/09/17 1754  Lipase  STAT      Final result     03/09/17 1755 03/09/17 1754  Amylase  STAT      Final result     03/09/17 1755 03/09/17 1754  Influenza antigen Nasopharyngeal Swab  STAT      Final result       ED Imaging Orders     None        Discharge Instructions       Take medications as directed.  Follow up with a primary care physician if you are not better with this treatment.      Your Scheduled Appointments     Mar 13, 2017  5:00 PM CDT   Established Occupational Therapy with Angy Mary LOTR [facility] ([facility])    [address]  [place] [address]   [phone]            Mar 15, 2017  5:00 PM CDT   Established Occupational Therapy with Angy Mary LOTR [facility] ([facility])    [address]  [place] [address]   [phone]            Mar 20, 2017  5:00 PM CDT   Established Occupational Therapy with Angy Mary LOTR [facility] ([facility])    [address] [facility] [facility] [address]  [place] [address]   [phone]            Mar 22, 2017  5:00 PM CDT   Established Occupational Therapy with Angy Mary LOTR [facility] ([facility])    [address] [facility] [facility]  [place] [address]   [phone]            Mar 27, 2017  5:00 PM CDT   Established Occupational Therapy with Angy Mary LOTR [facility] ([facility])    [address]  [place] [address]   [phone]              [facility] Sign-Up     Activating your [facility] account is as easy as 1-2-3!     1) Visit [email or web address], select Sign Up Now, enter this activation code and your date of birth, then select Next.  [email or web address]  Expires: 4/23/2017  7:54 PM      2) Create a username and password to use when you visit [facility] in the future and select a  security question in case you lose your password and select Next.    3) Enter your e-mail address and click Sign Up!    Additional Information  If you have questions, please e-mail [email or web address] or call [phone] to talk to our [facility] staff. Remember, [facility] is NOT to be used for urgent needs. For medical emergencies, dial 911.          [facility] complies with applicable Federal civil rights laws and does not discriminate on the basis of race, color, national origin, age, disability, or sex.        Language Assistance Services     ATTENTION: Language assistance services are available, free of charge. Please call [phone].      ATENCIÓN: Si habla bart, tiene a araujo disposición servicios gratuitos de asistencia lingüística. Llame al [phone].     CHÚ Ý: N?u b?n nói Ti?ng Vi?t, có các d?ch v? h? tr? ngôn ng? mi?n phí dành cho b?n. G?i s? [phone].

## 2017-03-10 NOTE — DISCHARGE INSTRUCTIONS
Take medications as directed.  Follow up with a primary care physician if you are not better with this treatment.

## 2017-03-13 ENCOUNTER — CLINICAL SUPPORT (OUTPATIENT)
Dept: REHABILITATION | Facility: HOSPITAL | Age: 32
End: 2017-03-13
Attending: ORTHOPAEDIC SURGERY
Payer: MEDICAID

## 2017-03-13 DIAGNOSIS — R53.1 WEAKNESS: ICD-10-CM

## 2017-03-13 DIAGNOSIS — M79.602 PAIN OF LEFT UPPER EXTREMITY: ICD-10-CM

## 2017-03-13 PROCEDURE — 97530 THERAPEUTIC ACTIVITIES: CPT | Mod: PN

## 2017-03-13 NOTE — PROGRESS NOTES
"TIME RECORD     Date:03/13/2017    Start Time: 430  Stop Time: 520     PROCEDURES:     TIMED  Procedure Min.    MT 10         TE 40       UNTIMED  Procedure Min.                Total Timed Minutes: 50  Total Timed Units: 3  Total Untimed Units: -  Charges Billed/# of units:TA3     OCCUPATIONAL THERAPY PROGRESS NOTE     Patient Name: Alfie Holly  Physician Name: Collins Dow  Primary Diagnosis: L shoulder dislocation  Treatment Diagnosis: Pain in limb, weakness, instability  Onset Date: 1 month ago  Eval Date: 02/01/2017  Certification Period: 02/01/2017 to 4/1/2017  Past Medical History: No past medical history on file.  Precautions: universal  Prior Therapy: None for shoulder  Signs of Abuse: no  Medications: Alfie Holly currently has no medications in their medication list.  Nutrition: Good  Social Cultural Assessment: Doesn't work  Prior Level of Function: Independent  Social History: Lives with Dad  Place of Residence (steps/adaptations): N/A  Functional Deficits Leading to Referral/Nature of Injury: Pt reports h/o multiple dislocations LUE however the least time he was reaching overhead to pull shirt off to take a shower and dislocated L shoulder and couldn't get it to go back in had to go to the ER; Pt states he thinks this started when he was younger and he was bocing and he went to hit with is LUE and felt a pop. With increased incidence of dislocation after that.   Patient Therapy Goals: To decrease pain and increase functional use LUE  Hand dominance: Right  X-Rays/Tests: There is left anterior glenohumeral dislocation which appears reduced on final transscapular Y-view. Hill-Sachs lesion noted. Acromioclavicular joint is maintained.     Subjective:     Pain: 0/10  "it doesn't hurt when I work anymore."    Objective:     Pt on UBE for/rev @90rpms 6 minutes prior to session. MT x 10 consisting of patient supine for L shoulder lateral telescoping, UT STM, pec lift, subscapularis MFR " "and stretch, PROM with endrange stretching, GHJ inferior anterior posterior glides grade I-III, gentle shoulder oscillations. Pt participated in OT therex for ROM,  stabilization and strengthening LUE per tx log:  Exercises Date 03/13/2017    Visit #6 FOTO @ 10   PROM (L) Shoulder FLEX/ABD/IR/ER 10x   Supine dowel Flex 5  2/15   SL ABD 4  2/15   SL ER 4  2/15   Corner stretch 3/30"   Sleeper Stretch 3/30"   IR pulley stretch 3/30"   Ball on wall stab 30x ea dir   Cable cross Lats 7#  2/15   Cable cross Rows 7#  2/15   Cable cross IR/ER 7#  2/15   Body blade 30"x 2 ea dir     Declined CP this date.     Assessment:     Pt participated well this date. Able to complete all therex in a pain free range and demonstrating good technique as well as tolerate progression of treatment well. No clicking noted this date. Minimal tenderness and tightness noted with STM.  Better endurance noted with therex. Pt motivated, progressing well to goals.  Pt would continue to benefit from skilled OT to address deficits. Anticipate d/c at the end of the month.    Patient Education/Response:     Cont with HEP     Plans and Goals:     Cont with OT POC    Goals to be met in 4 weeks: (3/2/17)  1) Initiate Hep   2) Pt will increase L shoulder AROM by 10 degrees grossly for improved performance with overhead ADL's  3) Pt will report 1/10 pain in (L)shoulder at worst  4) Pt will demonstrate increased MMT to 5/5 grossly L shoulder  5) Patient will be able to achieve less than or equal to 25% demonstrating overall improved functional ability with upper extremity.      Goals to be met by discharge:  1) Independent with HEP  2) Pt will demonstrate (L) shoulder AROM WNL grossly for [place] with ADL's  3) Pt will demonstrate (L) shoulder MMT WNL grossly for [place] with functional activities  4) Independent and pain free with ADL's and IADL's  5) Patient will be able to achieve less than or equal to 19% demonstrating overall improved " functional ability with upper extremity.

## 2017-03-15 ENCOUNTER — TELEPHONE (OUTPATIENT)
Dept: REHABILITATION | Facility: HOSPITAL | Age: 32
End: 2017-03-15

## 2017-03-20 ENCOUNTER — CLINICAL SUPPORT (OUTPATIENT)
Dept: REHABILITATION | Facility: HOSPITAL | Age: 32
End: 2017-03-20
Attending: ORTHOPAEDIC SURGERY
Payer: MEDICAID

## 2017-03-20 DIAGNOSIS — R53.1 WEAKNESS: ICD-10-CM

## 2017-03-20 DIAGNOSIS — M79.602 PAIN OF LEFT UPPER EXTREMITY: ICD-10-CM

## 2017-03-20 PROCEDURE — 97530 THERAPEUTIC ACTIVITIES: CPT | Mod: PN

## 2017-03-20 NOTE — PROGRESS NOTES
"TIME RECORD     Date:03/20/2017    Start Time: 445  Stop Time: 535     PROCEDURES:     TIMED  Procedure Min.    MT 10         TE 40       UNTIMED  Procedure Min.                Total Timed Minutes: 50  Total Timed Units: 3  Total Untimed Units: -  Charges Billed/# of units:TA3     OCCUPATIONAL THERAPY PROGRESS NOTE     Patient Name: Alfie Holly  Physician Name: Collins Dow  Primary Diagnosis: L shoulder dislocation  Treatment Diagnosis: Pain in limb, weakness, instability  Onset Date: 1 month ago  Eval Date: 02/01/2017  Certification Period: 02/01/2017 to 4/1/2017  Past Medical History: No past medical history on file.  Precautions: universal  Prior Therapy: None for shoulder  Signs of Abuse: no  Medications: Alfie Holly currently has no medications in their medication list.  Nutrition: Good  Social Cultural Assessment: Doesn't work  Prior Level of Function: Independent  Social History: Lives with Dad  Place of Residence (steps/adaptations): N/A  Functional Deficits Leading to Referral/Nature of Injury: Pt reports h/o multiple dislocations LUE however the least time he was reaching overhead to pull shirt off to take a shower and dislocated L shoulder and couldn't get it to go back in had to go to the ER; Pt states he thinks this started when he was younger and he was bocing and he went to hit with is LUE and felt a pop. With increased incidence of dislocation after that.   Patient Therapy Goals: To decrease pain and increase functional use LUE  Hand dominance: Right  X-Rays/Tests: There is left anterior glenohumeral dislocation which appears reduced on final transscapular Y-view. Hill-Sachs lesion noted. Acromioclavicular joint is maintained.     Subjective:     Pain: 0/10  "I feel like I'm doing better." Pt requesting to drop down to 1x a week secondary to needing to work and functional performance improving.    Objective:     Pt on UBE for/rev @90rpms 6 minutes prior to session. MT x 10 " "consisting of patient supine for L shoulder lateral telescoping, UT STM, pec lift, subscapularis MFR and stretch, PROM with endrange stretching, GHJ inferior anterior posterior glides grade I-III, gentle shoulder oscillations. Pt participated in OT therex for ROM,  stabilization and strengthening LUE per tx log:  Exercises Date 03/20/2017    Visit #7 FOTO @ 10   PROM (L) Shoulder FLEX/ABD/IR/ER 10x   Supine dowel Flex 5  2/15   SL ABD 4  2/15   SL ER 4  2/15   Corner stretch 3/30"   Sleeper Stretch 3/30"   IR pulley stretch 3/30"   Ball on wall stab 30x ea dir   Cable cross Lats 7#  2/15   Cable cross Rows 7#  2/15   Cable cross IR/ER 7#  2/15   Body blade 30"x 2 ea dir     Declined CP this date.     Assessment:     Pt participated well this date. Able to complete all therex in a pain free range and demonstrating good technique as well as tolerate progression of treatment well. Minimal tenderness and tightness noted with STM.  Better endurance noted with therex. Pt motivated, progressing well to goals.  Pt would continue to benefit from skilled OT to address deficits. Anticipate d/c next week.    Patient Education/Response:     Cont with HEP     Plans and Goals:     Cont with OT POC    Goals to be met in 4 weeks: (3/2/17)  1) Initiate Hep   2) Pt will increase L shoulder AROM by 10 degrees grossly for improved performance with overhead ADL's  3) Pt will report 1/10 pain in (L)shoulder at worst  4) Pt will demonstrate increased MMT to 5/5 grossly L shoulder  5) Patient will be able to achieve less than or equal to 25% demonstrating overall improved functional ability with upper extremity.      Goals to be met by discharge:  1) Independent with HEP  2) Pt will demonstrate (L) shoulder AROM WNL grossly for [place] with ADL's  3) Pt will demonstrate (L) shoulder MMT WNL grossly for [place] with functional activities  4) Independent and pain free with ADL's and IADL's  5) Patient will be able to achieve less " than or equal to 19% demonstrating overall improved functional ability with upper extremity.

## 2017-03-27 ENCOUNTER — TELEPHONE (OUTPATIENT)
Dept: REHABILITATION | Facility: HOSPITAL | Age: 32
End: 2017-03-27

## 2017-05-03 ENCOUNTER — DOCUMENTATION ONLY (OUTPATIENT)
Dept: REHABILITATION | Facility: HOSPITAL | Age: 32
End: 2017-05-03

## 2017-05-03 PROBLEM — M79.602 PAIN OF LEFT UPPER EXTREMITY: Status: RESOLVED | Noted: 2017-02-01 | Resolved: 2017-05-03

## 2017-05-03 PROBLEM — R53.1 WEAKNESS: Status: RESOLVED | Noted: 2017-02-01 | Resolved: 2017-05-03

## 2017-05-03 NOTE — PROGRESS NOTES
REHAB SERVICES OUTPATIENT DISCHARGE SUMMARY  Occupational Therapy      Name:  Alfie Holly  Date:  05/03/2017  Date of Evaluation:  2/1/17  Physician:  Victor M  Total # Of Visits:  7  Cancelled:  Multiple cancel and no shows   Diagnosis:  L shoulder pain  Physical/Functional Status:  At time of discharge, patient was able to   Range of Motion:   Shoulder  Left      AROM   flexion 140(=)   extension 65(=)   abduction 135(+20)   adduction 45(=)   Internal rotation L4(=)   ER at 90° abd 75(+5)   ER at 0° abd 60(+10)   Elbow flex WNL      Last msmts taken on 2/20/17. Pt did not show for last scheduled appts    The patient is to be discharged from our Therapy service for the following reason(s):  Patient has not attended therapy since 3/20/17    Degree of Goal Achievement:  Patient has partially met goals    Patient Education:  On current condition and HEP  Discharge Plan:  Home Program: see attached in media

## 2017-10-21 ENCOUNTER — HOSPITAL ENCOUNTER (EMERGENCY)
Facility: HOSPITAL | Age: 32
Discharge: HOME | End: 2017-10-21
Attending: EMERGENCY MEDICINE
Payer: MEDICAID

## 2017-10-21 VITALS
HEART RATE: 68 BPM | OXYGEN SATURATION: 100 % | HEIGHT: 67 IN | WEIGHT: 145 LBS | RESPIRATION RATE: 20 BRPM | SYSTOLIC BLOOD PRESSURE: 137 MMHG | TEMPERATURE: 99 F | BODY MASS INDEX: 22.76 KG/M2 | DIASTOLIC BLOOD PRESSURE: 90 MMHG

## 2017-10-21 DIAGNOSIS — M67.432 GANGLION CYST OF WRIST, LEFT: Primary | ICD-10-CM

## 2017-10-21 PROCEDURE — 99283 EMERGENCY DEPT VISIT LOW MDM: CPT | Mod: 25

## 2017-10-21 PROCEDURE — 25000003 PHARM REV CODE 250

## 2017-10-21 PROCEDURE — 20612 ASPIRATE/INJ GANGLION CYST: CPT

## 2017-10-21 RX ORDER — LIDOCAINE HYDROCHLORIDE 10 MG/ML
1 INJECTION INFILTRATION; PERINEURAL ONCE
Status: DISCONTINUED | OUTPATIENT
Start: 2017-10-21 | End: 2017-10-21 | Stop reason: HOSPADM

## 2017-10-21 RX ORDER — LIDOCAINE HYDROCHLORIDE 10 MG/ML
INJECTION INFILTRATION; PERINEURAL
Status: COMPLETED
Start: 2017-10-21 | End: 2017-10-21

## 2017-10-21 RX ORDER — IBUPROFEN 800 MG/1
800 TABLET ORAL EVERY 6 HOURS PRN
Qty: 20 TABLET | Refills: 0 | Status: SHIPPED | OUTPATIENT
Start: 2017-10-21

## 2017-10-21 RX ADMIN — LIDOCAINE HYDROCHLORIDE 200 MG: 10 INJECTION, SOLUTION INFILTRATION; PERINEURAL at 11:10

## 2017-10-21 NOTE — ED NOTES
Patient has verified the spelling of their name and  on armband  LOC: The patient is awake, alert, and aware of environment with an appropriate affect, the patient is oriented x 3 and speaking appropriately.   APPEARANCE: Patient resting comfortably and in no acute distress, patient is clean and well groomed, patient's clothing is properly fastened.   SKIN: The skin is warm and dry, color consistent with ethnicity, patient has normal skin turgor and moist mucus membranes, skin intact, no breakdown or bruising noted.   :   Voids without difficulty  MUSCULOSKELETAL: Patient moving all extremities spontaneously, no obvious swelling or deformities noted.   RESPIRATORY: Airway is open and patent, respirations are spontaneous, patient has a normal effort and rate, no accessory muscle use noted, bilateral breath sounds clear, denies SOB   ABDOMEN: Soft and non tender to palpation, no distention noted, normoactive bowel sounds present in all four quadrants.   CARDIAC:  Normal rate and rhythm, no peripheral edema noted, less then 3 second capillary refill, denies chest pain  COMPLAINT:  Left wrist had surgery in , one year ago started having swelling to top of wrist at incision site, 2 weeks ago started having pain and ganglion cyst to left wrist which he rates 7 out of 10 - denies fall or injury; strong pulse to left wrist and left hand, less then 3 second capillary refill to left hand with good range of motion to left hand and fingers

## 2017-11-05 NOTE — ED PROVIDER NOTES
Encounter Date: 10/21/2017       History     Chief Complaint   Patient presents with    Skin Problem     C/o circular nodule noted to top of L wrist that pt has had since 2009. Pt states he had surgery to have growth removed that it has come back and is painful.      31-year-old male presents to the emergency department with painful swelling over the top part of his left dorsum of his wrist.  He denies fevers chills nausea vomiting he denies erythema.  He has previously had this excised, and has a scar over it.  He denies trauma.  Symptoms are constant, aching, throbbing no associated numbness or weakness.      The history is provided by the patient.     Review of patient's allergies indicates:  No Known Allergies  No past medical history on file.  No past surgical history on file.  No family history on file.  Social History   Substance Use Topics    Smoking status: Never Smoker    Smokeless tobacco: Never Used    Alcohol use No     Review of Systems   Eyes: Negative.    Endocrine: Negative.    Allergic/Immunologic: Negative.    All other systems reviewed and are negative.      Physical Exam     Initial Vitals [10/21/17 1016]   BP Pulse Resp Temp SpO2   (!) 131/91 71 18 98.4 °F (36.9 °C) 100 %      MAP       104.33         Physical Exam    Nursing note and vitals reviewed.  Constitutional: He appears well-developed and well-nourished.   HENT:   Head: Normocephalic.   Eyes: Pupils are equal, round, and reactive to light.   Cardiovascular: Normal heart sounds.   Abdominal: Soft.   Musculoskeletal:   Has an area of tenderness over the dorsum of his wrist, underneath the scar.  Appears very congested last ganglion cyst.  Bedside ultrasound confirming anechoic region.  Aspiration of 1.5cc of thick clear viscious fluid   Neurological: He is alert and oriented to person, place, and time.   Skin: Skin is warm.   Psychiatric: He has a normal mood and affect.         ED Course   Ganglion Cyst Aspiration  Date/Time:  10/21/2017 11:32 AM  Performed by: ERIC HERRON  Authorized by: ERIC HERRON     Consent Done?:  Yes  Universal Protocol:     Verbal consent obtained?: Yes      Risks and benefits: Risks, benefits and alternatives were discussed      Consent given by:  Patient    Patient states understanding of procedure being performed: Yes      Patient's understanding of procedure matches consent: Yes      Procedure consent matches procedure scheduled: Yes      Relevant documents present and verified: Yes      Test results available and properly labeled: Yes      Site marked: Yes      Imaging studies available: Yes      Patient identity confirmed:  Verbally with patient    Time out: Immediately prior to the procedure a time out was called    A time out verifies correct patient, procedure, equipment, support staff and site/side marked as required:   Anesthesia:     Anesthesia:  Local infiltration    Local anesthetic:  Lidocaine 1% without epinephrine    Anesthetic total (ml):  1  Procedure Details:     Site prepped with:  Alcohol and Chlorhexadine    Location of Abscess #1:  Left wrist    Size of needle #1:  18    Aspirated amount #1 (mL):  1.5  Post-procedure:     Patient tolerance:  Patient tolerated the procedure well with no immediate complications      Labs Reviewed - No data to display          Medical Decision Making:   Initial Assessment:   30 yo M here with ganglion cyst. Offered aspiration vs conservative management. He requested aspiration. Performed under standard sterile techniques.  Wound was covered with sterile bandage afterwards.  He was given anticipatory recommendations, and follow-up on an outpatient basis.  He had improvement after aspiration, and expressed understanding.                   ED Course      Clinical Impression:   The encounter diagnosis was Ganglion cyst of wrist, left.    Disposition:   Disposition: Discharged  Condition: Stable                        Eric Herron MD  11/05/17  0831

## 2018-03-28 DIAGNOSIS — M67.432 GANGLION OF LEFT WRIST: Primary | ICD-10-CM

## 2018-03-29 DIAGNOSIS — M67.432 GANGLION OF LEFT WRIST: Primary | ICD-10-CM

## 2018-04-09 ENCOUNTER — CLINICAL SUPPORT (OUTPATIENT)
Dept: REHABILITATION | Facility: HOSPITAL | Age: 33
End: 2018-04-09
Payer: MEDICAID

## 2018-04-09 DIAGNOSIS — M25.639 DECREASED ROM OF WRIST: ICD-10-CM

## 2018-04-09 DIAGNOSIS — R29.898 DECREASED GRIP STRENGTH OF LEFT HAND: ICD-10-CM

## 2018-04-09 DIAGNOSIS — M25.532 WRIST PAIN, LEFT: ICD-10-CM

## 2018-04-09 DIAGNOSIS — R60.0 HAND EDEMA: ICD-10-CM

## 2018-04-09 PROCEDURE — 97165 OT EVAL LOW COMPLEX 30 MIN: CPT | Mod: PO

## 2018-04-09 NOTE — PLAN OF CARE
[facility]  Occupational Therapy -Hand / Wrist  Evaluation      Date: 2018  Patient: Alfie Cordova  YOB: 1985  Chart Number: [id number]  Referring Physician: Ansley Webster MD  Diagnosis:   1. Wrist pain, left     2. Hand edema     3. Decreased ROM of wrist     4. Decreased  strength of left hand         Visit #: 1 of 1. Visits  on 3/29/18.    Time In: 1:00PM   Time Out: 1:50PM     Subjective     Involved Side: Left  Dominant Side: Left  Date of Onset: 1 Year ago   History of Current Condition: Pt had ganglion cyst that started small and grew. Pt first saw MD 5 days before surgery. Pt had surgery to remove ganglion cyst.   Mechanism of Injury: Insidious  Date of Surgery: 2018  Surgical Procedure: Ganglion Cyst Removal from dorsal left wrist  Imaging: N/A   Previous Therapy:  None  Date of Return to MD: N/A     Pain:  Functional Pain Scale Rating 0-10:   4/10 on average  0/10 at best  4/10 at worst    Location: L Wrist   Description: Aching and Sharp  Alleviators: None    Functional Limitations: Patient presents with the following functional Limitations affecting ADLS, work and leisure tasks:   Previous functional status includes: Independent with all ADLs.     Current FunctionalStatus   Home/Living environment : lives with a friend      Limitation of Functional Status as follows:   ADLs/IADLs:     - Feeding: No Difficulty     - Bathing: No Difficulty     - Dressing: No Difficulty     - Grooming: No Difficulty     - Driving: Minimal difficulty turning with L hand      Leisure: Difficulty carrying heavy objects     Occupation:  Construction Work   Working presently: not working currently but wants to get back to work  Duties: Construction duties     Patient's Goals for Therapy: Increase wrist ROM     Past Medical History/Physical Systems Review:   No past medical history on file.  Review of patient's allergies indicates:  No Known Allergies  Current  Outpatient Prescriptions   Medication Sig    fluticasone (FLONASE) 50 mcg/actuation nasal spray 2 sprays by Each Nare route 2 (two) times daily as needed.    ibuprofen (ADVIL,MOTRIN) 800 MG tablet Take 1 tablet (800 mg total) by mouth every 6 (six) hours as needed for Pain.    loratadine (CLARITIN) 10 mg tablet Take 1 tablet (10 mg total) by mouth once daily.    sucralfate (CARAFATE) 1 gram tablet Take 1 tablet (1 g total) by mouth 4 (four) times daily as needed (abdominal cramping).     No current facility-administered medications for this visit.        Barriers:  Environmental Concerns/ Fall Risk:  None  Barriers to Learning: None   Cultural/Spiritual : None   Developmental/Education: None   Abuse/ Neglect: none   Nutritional Deficit: None   Language: None   Hearing/Vision Deficit: None   History of Cancer: None     Precautions:  Standard      Objective     Observation:   5 cm with scar on dorsal side of wrist     Sensation: Gross Sensation Intact     Edema: Circumferential measurements: In centimters     Right Left   MPs 17.8 cm 17.0 cm   PPC (Proximal Smith Crease) 20.7 cm 20.3 cm   PWC (Proximal Wrist Crease) 15.9 cm 16.8 cm       Range of Motion:     Wrist ROM: Left  Active   4/9/2018   Flexion 30   Extension 20   Radial Deviation 20   Ulnar Deviation 20   Supination 90   Pronation 75        Strength: (LAUREN Dynamometer #2 in lbs.) Average 3 trials, Position II:     4/9/2018 4/9/2018    Left Right   Rung II  39#  48#       Pinch Strength (Measured in psi)     4/9/2018 4/9/2018    Left Right   Key Pinch  11 psi 13 psi   3pt Pinch 8 psi 15 psi   2pt Pinch 5 psi  9 psi       Outcome Measure: FOTO    Current Score  = 39% or 61% impaired  Goal at Discharge Score = 62% or 38% impaired      Treatment     Issued HEP and educated on modality use for pain management (see patient instructions). Pt verbally understood the instructions as they were given and demonstrated proper form and technique during therapy.  Pt was advise to perform these exercises free of pain, and to stop performing them if pain occurs.    Patient received paraffin bath to L hand(s) for 10 minutes to increase blood flow, circulation, pain management and for tissue elasticity prior to therex. Performed scar massage to dorsal wrist area for 5 minutes to decrease adhesions and improve tensile glide.         Patient/Family Education: role of OT, goals for OT, scheduling/cancellations - pt verbalized understanding. Discussed insurance limitations with patient.      Assessment     Alfie Cordova is a 32 y.o. male referred to outpatient occupational/hand therapy and presents with a medical diagnosis of Dorsal wrist ganglion cyst excision, resulting in decreased wrist ROM and demonstrates limitations as described in the chart below. Following brief medical record review it is determined that pt will benefit from occupational therapy services in order to maximize pain free and/or functional use of left hand. The following goals were discussed with the patient and patient is in agreement with them as to be addressed in the treatment plan. The patient's rehab potential is Excellent.     Pt has no cultural, educational or language barriers to learning provided.    Profile and History Assessment of Occupational Performance Level of Clinical Decision Making Complexity Score   Occupational Profile:   Alfie Cordova is a 32 y.o. male who lives with a friend and is currently unemployed as . Alfie Cordova has difficulty with  none  driving/transportation management  affecting his/her daily functional abilities. His/her main goal for therapy is increasing wrist ROM.     Comorbidities:   none    Medical and Therapy History Review:   Brief               Performance Deficits    Physical:  Joint Mobility  Skin Integrity/Scar Formation  Edema   Strength  Pinch Strength  Pain    Cognitive:  Communication   English  Deficits    Psychosocial:    No Deficits     Clinical Decision Making:  low    Assessment Process:  Comprehensive Assessments    Modification/Need for Assistance:  Not Necessary    Intervention Selection:  Multiple Treatment Options       low  Based on PMHX, co morbidities , data from assessments and functional level of assistance required with task and clinical presentation directly impacting function.         Goals:     Short Term (4 weeks on 4/30/18):  1)   Patient to be IND with HEP and modalities for pain management  2)   Increase ROM 10 degrees in wrist flexion plane of motion to increase functional hand use for carrying objects  3)   Increase ROM 10 degrees in wrist extension plane of motion to increase functional hand use for turning steering wheel.   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   5)   Decrease edema .2-.3 cm to increase joint mobility /flexibility for improved overall functional hand use.     Long Term (by discharge):  1)   Pt will report 0 out of 10 pain with functional activity.  2)   Patient to score at 62% or more on FOTO to demonstrate improved perception of functional hand use.  3)   Pt will return to prior level of function for ADLs and household management.       Plan     Pt to be treated by Occupational Therapy 2 times per week for 6 weeks during the certification period from 4/9/2018 to 5/24/2018 to achieve the established goals.     Treatment to include: Paraffin, Fluidotherapy, Manual therapy/joint mobilizations, Modalities for pain management, US 3 mhz, Therapeutic exercises/activities., Strengthening, Edema Control, Scar Management, Joint Protection and Energy Conservation, as well as any other treatments deemed necessary based on the patient's needs or progress.

## 2018-04-09 NOTE — PATIENT INSTRUCTIONS
AROM: Wrist Flexion / Extension        Actively bend right wrist forward then back as far as possible.  Repeat 15 times per set. Do 2 sets per session. Do 2-3 sessions per day.    Copyright © I. All rights reserved.   AROM: Forearm Pronation / Supination        With right arm in handshake position, slowly rotate palm down until stretch is felt. Relax. Then rotate palm up until stretch is felt.  Repeat 15 times per set. Do 2 sets per session. Do 2-3 sessions per day.    Copyright © [employer]. All rights reserved.   Scar Tissue Massage        Place pad of fingertip on scar area. Apply steady downward pressure while moving in circular fashion. Use another fin-nate on top to assist. Repeat until entire scar has been covered.  Repeat 15 times. Do 2-3 sessions per day.    Copyright © [employer]. All rights reserved.

## 2018-04-16 ENCOUNTER — CLINICAL SUPPORT (OUTPATIENT)
Dept: REHABILITATION | Facility: HOSPITAL | Age: 33
End: 2018-04-16
Payer: MEDICAID

## 2018-04-16 DIAGNOSIS — M25.532 WRIST PAIN, LEFT: ICD-10-CM

## 2018-04-16 DIAGNOSIS — R60.0 HAND EDEMA: ICD-10-CM

## 2018-04-16 DIAGNOSIS — M25.639 DECREASED ROM OF WRIST: ICD-10-CM

## 2018-04-16 DIAGNOSIS — R29.898 DECREASED GRIP STRENGTH OF LEFT HAND: ICD-10-CM

## 2018-04-16 PROCEDURE — 97530 THERAPEUTIC ACTIVITIES: CPT | Mod: PO

## 2018-04-16 NOTE — PROGRESS NOTES
Occupational Therapy Daily Treatment Note     Name: Alfie Cordova  MRN: [id number]  Physician: Ansley Webster MD  Diagnosis:   Encounter Diagnoses   Name Primary?    Wrist pain, left     Hand edema     Decreased ROM of wrist     Decreased  strength of left hand        Visit Date: 4/16/2018  Visit #: 2 / 12  Authorization period Expiration: 12/31/18  Plan of Care Expiration: 5/24/18  Precautions: Standard  Date of Return to MD: About 2 months, per patient he is unsure of exact date     Time In: 2pm  Time Out: 3pm  Total 1:1 Treatment Time: 60 min    Subjective     Pt reports: Minimal pain only over incision site, however with movement he has pain and stiffness into fingers. He states his schedule is difficult and he will only be able to come 1x/week.   Pain Scale:  2/10 on VAS currently.   Pain Location: Left dorsal wrist  Response to previous treatment: No adverse effects reported, he reports compliance with HEP.     Objective     Adam received the following supervised modalities after being cleared for contradictions:   -Patient received paraffin bath to L hand(s) for 10 minutes to increase blood flow, circulation, pain management and for tissue elasticity prior to therex.     Adam received the following direct contact modalities after being cleared for contraindications:  -Patient received ultrasound to  L dorsal wrist area to increase blood flow, circulation, tissue elasticity, pain management and for wound/scar management for 8 minutes @ 3.3 Mhz, Intensity 0.8 w/cm2 at 100% duty cycle.     Adam received the following manual therapy techniques:   -Scar extraction x 5 minutes prior to scar massage.   -Performed scar massage to L dorsal wrist area for 20 minutes with and without mini vibrator to decrease adhesions and improve tensile glide.   -Scar mobilization with dycem x 5 min.     Adam received therapeutic exercises to develop ROM and flexibility for 8 minutes  including:  -LLPS over wedge with wrist in flexion with 3# weight      Home Exercises and Education Provided     Education provided: Scar tissue formation and mobilization to improve ROM   - Progress towards goals   - Role of therapy, goals for therapy  No spiritual or educational barriers to learning provided    Written Home Exercises Provided: Continue HEP, emphasis on scar mobilization. Issued dycem for improved mobilization.   Exercises were reviewed and Adam was able to demonstrate them prior to the end of the session.   Pt received a written copy of exercises to perform at home. Adam demonstrated good  understanding of the education provided.    Assessment     Pt would continue to benefit from skilled OT. Pt presents with dense collection of scar tissue at wrist although he reports compliance with HEP and is able to perform in clinic today. There are adhesions noted on dorsum of wrist limiting flexion.  Adam is progressing well towards his goals and there are no updates to goals at this time. Pt prognosis is Good. Pt will continue to benefit from skilled outpatient occupational therapy to address the deficits listed in the problem list below, provide pt/family education and to maximize pt's level of independence in the home and community environment.     Medical necessity:  Patient continues to demonstrate limitation with  ROM, Joint mobility, Stiffness, Decreased fine motor coordination, Decreased functional use, Continued pain and Scar Tissue formation all of which interfere with pt's independent vocational and leisurely pursuits.     Anticipated barriers to occupational therapy: Slight language barrier   Pt's spiritual, cultural and educational needs considered and pt agreeable to plan of care and goals.    Short Term goals: 4/30/18  1)   Patient to be IND with HEP and modalities for pain management  2)   Increase ROM 10 degrees in wrist flexion plane of motion to increase functional hand use for  carrying objects  3)   Increase ROM 10 degrees in wrist extension plane of motion to increase functional hand use for turning steering wheel.   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   5)   Decrease edema .2-.3 cm to increase joint mobility /flexibility for improved overall functional hand use.     Long Term goals: Discharge  1)   Pt will report 0 out of 10 pain with functional activity.  2)   Patient to score at 62% or more on FOTO to demonstrate improved perception of functional hand use.  3)   Pt will return to prior level of function for ADLs and household management.     Plan   Continue with established Plan of Care towards Occupational Therapy goals.   Discussed Plan of Care with patient: Yes  Updates/Grading for next session: Putty - yellow

## 2018-05-01 ENCOUNTER — CLINICAL SUPPORT (OUTPATIENT)
Dept: REHABILITATION | Facility: HOSPITAL | Age: 33
End: 2018-05-01
Payer: MEDICAID

## 2018-05-01 DIAGNOSIS — R29.898 DECREASED GRIP STRENGTH OF LEFT HAND: ICD-10-CM

## 2018-05-01 DIAGNOSIS — M25.639 DECREASED ROM OF WRIST: ICD-10-CM

## 2018-05-01 DIAGNOSIS — M25.532 WRIST PAIN, LEFT: ICD-10-CM

## 2018-05-01 DIAGNOSIS — R60.0 HAND EDEMA: ICD-10-CM

## 2018-05-01 PROCEDURE — 97530 THERAPEUTIC ACTIVITIES: CPT | Mod: PO

## 2018-05-01 NOTE — PROGRESS NOTES
"                            Occupational Therapy Daily Treatment Note     Name: Alfie Cordova  MRN: [id number]  Physician: Ansley Webster MD  Diagnosis:   Encounter Diagnoses   Name Primary?    Wrist pain, left     Hand edema     Decreased ROM of wrist     Decreased  strength of left hand        Visit Date: 5/1/2018  Visit #: 3 / 12  Authorization period Expiration: 12/31/18  Plan of Care Expiration: 5/24/18  Precautions: Standard  Date of Return to MD: About 2 months, per patient he is unsure of exact date     Time In: 10am  Time Out: 11am  Total 1:1 Treatment Time: 60 min    Subjective     Pt reports: "Brittanie been trying to move it more but it hurts and it is still stiff"  Pain Scale:  3/10 on VAS currently.   Pain Location: Left dorsal wrist  Response to previous treatment: Minima increase in pain but also increase in flexibilty    Objective     Adam received the following supervised modalities after being cleared for contradictions:   -Patient received paraffin bath to L hand(s) for 10 minutes to increase blood flow, circulation, pain management and for tissue elasticity prior to therex.     Adam received the following direct contact modalities after being cleared for contraindications:  -Patient received ultrasound to  L dorsal wrist area to increase blood flow, circulation, tissue elasticity, pain management and for wound/scar management for 8 minutes @ 3.3 Mhz, Intensity 0.9 w/cm2 at 100% duty cycle.     Adam received the following manual therapy techniques:   -Scar extraction x 5 minutes prior to scar massage.   -Performed scar massage to L dorsal wrist area for 20 minutes with and without mini vibrator to decrease adhesions and improve tensile glide.   -Scar mobilization with dycem x 5 min.   -Cupping for scar extraction x 5 min    Adam received therapeutic exercises to develop ROM and flexibility for 8 minutes including:  -LLPS over wedge with wrist in flexion with 3# weight      Home " Exercises and Education Provided     Education provided: Scar tissue formation and mobilization to improve ROM   - Progress towards goals   - Role of therapy, goals for therapy  No spiritual or educational barriers to learning provided    Written Home Exercises Provided: Continue HEP, emphasis on scar mobilization.   Exercises were reviewed and Adam was able to demonstrate them prior to the end of the session.   Pt received a written copy of exercises to perform at home. Adam demonstrated good  understanding of the education provided.    Assessment     Pt would continue to benefit from skilled OT. Pt presents with dense collection of scar tissue at wrist limting flexion. Following all therapeutic techniques he has visibly increased motion in plane of flexion and extension today .  Adam is progressing well towards his goals and there are no updates to goals at this time. Pt prognosis is Good. Pt will continue to benefit from skilled outpatient occupational therapy to address the deficits listed in the problem list below, provide pt/family education and to maximize pt's level of independence in the home and community environment.     Medical necessity:  Patient continues to demonstrate limitation with  ROM, Joint mobility, Stiffness, Decreased fine motor coordination, Decreased functional use, Continued pain and Scar Tissue formation all of which interfere with pt's independent vocational and leisurely pursuits.     Anticipated barriers to occupational therapy: Slight language barrier   Pt's spiritual, cultural and educational needs considered and pt agreeable to plan of care and goals.    Short Term goals: 4/30/18  1)   Patient to be IND with HEP and modalities for pain management  2)   Increase ROM 10 degrees in wrist flexion plane of motion to increase functional hand use for carrying objects  3)   Increase ROM 10 degrees in wrist extension plane of motion to increase functional hand use for turning steering  wheel.   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   5)   Decrease edema .2-.3 cm to increase joint mobility /flexibility for improved overall functional hand use.     Long Term goals: Discharge  1)   Pt will report 0 out of 10 pain with functional activity.  2)   Patient to score at 62% or more on FOTO to demonstrate improved perception of functional hand use.  3)   Pt will return to prior level of function for ADLs and household management.     Plan   Continue with established Plan of Care towards Occupational Therapy goals.   Discussed Plan of Care with patient: Yes  Updates/Grading for next session: Putty - yellow

## 2018-05-08 ENCOUNTER — CLINICAL SUPPORT (OUTPATIENT)
Dept: REHABILITATION | Facility: HOSPITAL | Age: 33
End: 2018-05-08
Payer: MEDICAID

## 2018-05-08 DIAGNOSIS — R29.898 DECREASED GRIP STRENGTH OF LEFT HAND: ICD-10-CM

## 2018-05-08 DIAGNOSIS — M25.639 DECREASED ROM OF WRIST: ICD-10-CM

## 2018-05-08 DIAGNOSIS — M25.532 WRIST PAIN, LEFT: ICD-10-CM

## 2018-05-08 DIAGNOSIS — R60.0 HAND EDEMA: ICD-10-CM

## 2018-05-08 PROCEDURE — 97530 THERAPEUTIC ACTIVITIES: CPT | Mod: PO

## 2018-05-08 NOTE — PROGRESS NOTES
"                            Occupational Therapy Daily Treatment Note     Name: Alfie Cordova  MRN: [id number]  Physician: Ansley Webster MD  Diagnosis:   Encounter Diagnoses   Name Primary?    Wrist pain, left     Hand edema     Decreased ROM of wrist     Decreased  strength of left hand        Visit Date: 5/8/2018  Visit #: 4 / 12  Authorization period Expiration: 12/31/18  Plan of Care Expiration: 5/24/18  Precautions: Standard  Date of Return to MD: About 2 months, per patient he is unsure of exact date     Time In: 10am  Time Out: 11am  Total 1:1 Treatment Time: 60 min    Subjective     Pt reports: "My wrist doesn't want to move when I try to bend it"  Pain Scale:  2/10 on VAS currently.   Pain Location: Left dorsal wrist  Response to previous treatment: "Pain has gone better since last time and with scar massage at home, but after a while my wrist got tight again"    Objective     Adam received the following supervised modalities after being cleared for contradictions:   -Patient received paraffin bath to L hand(s) for 10 minutes to increase blood flow, circulation, pain management and for tissue elasticity prior to therex.     Adam received the following direct contact modalities after being cleared for contraindications:  -Patient received ultrasound to  L dorsal wrist area to increase blood flow, circulation, tissue elasticity, pain management and for wound/scar management for 8 minutes @ 3.3 Mhz, Intensity 0.9 w/cm2 at 100% duty cycle.     Adam received the following manual therapy techniques:   -Scar extraction x 5 minutes prior to scar massage.   -Performed scar massage to L dorsal wrist area for 20 minutes with and without mini vibrator to decrease adhesions and improve tensile glide.   -Scar mobilization with dycem x 5 min.   -KT full stretch distal to proximal over scarred area of wrist.    Adam received therapeutic exercises to develop ROM and flexibility for 8 minutes " including:  -LLPS over wedge with wrist in flexion with 4lb weight      Home Exercises and Education Provided     Education provided: Scar tissue formation and mobilization to improve ROM   - Progress towards goals   - Role of therapy, goals for therapy  No spiritual or educational barriers to learning provided    Written Home Exercises Provided: Continue HEP, emphasis on scar mobilization.   Exercises were reviewed and Adam was able to demonstrate them prior to the end of the session.   Pt received a written copy of exercises to perform at home. Adam demonstrated good  understanding of the education provided.    Assessment     Pt would continue to benefit from skilled OT. Pt presents with dense collection of scar tissue at wrist limting flexion. Following all therapeutic techniques he has visibly increased motion in plane of flexion and extension today .  Adam is progressing well towards his goals and there are no updates to goals at this time. Pt prognosis is Good. Pt will continue to benefit from skilled outpatient occupational therapy to address the deficits listed in the problem list below, provide pt/family education and to maximize pt's level of independence in the home and community environment.     Medical necessity:  Patient continues to demonstrate limitation with  ROM, Joint mobility, Stiffness, Decreased fine motor coordination, Decreased functional use, Continued pain and Scar Tissue formation all of which interfere with pt's independent vocational and leisurely pursuits.     Anticipated barriers to occupational therapy: Slight language barrier   Pt's spiritual, cultural and educational needs considered and pt agreeable to plan of care and goals.    Short Term goals: 4/30/18  1)   Patient to be IND with HEP and modalities for pain management  2)   Increase ROM 10 degrees in wrist flexion plane of motion to increase functional hand use for carrying objects  3)   Increase ROM 10 degrees in wrist  "extension plane of motion to increase functional hand use for turning steering wheel.   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   5)   Decrease edema .2-.3 cm to increase joint mobility /flexibility for improved overall functional hand use.     Long Term goals: Discharge  1)   Pt will report 0 out of 10 pain with functional activity.  2)   Patient to score at 62% or more on FOTO to demonstrate improved perception of functional hand use.  3)   Pt will return to prior level of function for ADLs and household management.     Plan   Continue with established Plan of Care towards Occupational Therapy goals.   Discussed Plan of Care with patient: Yes  Updates/Grading for next session: Putty - yellow and R/A ROM         Student: BLANCA Vail    " I certify that I was present in the room directing the student in service delivery and guiding them using my skilled judgement. As the co-signing therapist, I have reviewed the student's documentation and am responsible for the treatment, assessment and plan."    Therapist: SJ Jean  "

## 2018-05-15 ENCOUNTER — CLINICAL SUPPORT (OUTPATIENT)
Dept: REHABILITATION | Facility: HOSPITAL | Age: 33
End: 2018-05-15
Payer: MEDICAID

## 2018-05-15 DIAGNOSIS — R29.898 DECREASED GRIP STRENGTH OF LEFT HAND: ICD-10-CM

## 2018-05-15 DIAGNOSIS — M25.532 WRIST PAIN, LEFT: ICD-10-CM

## 2018-05-15 DIAGNOSIS — M25.639 DECREASED ROM OF WRIST: ICD-10-CM

## 2018-05-15 DIAGNOSIS — R60.0 HAND EDEMA: ICD-10-CM

## 2018-05-15 PROCEDURE — 97530 THERAPEUTIC ACTIVITIES: CPT | Mod: PO

## 2018-05-15 NOTE — PLAN OF CARE
Date: 05/15/2018    OCCUPATIONAL THERAPY UPDATED PLAN OF TREATMENT    Patient name: Alfie Cordova  SOC Date:  4/9/18  Primary Diagnosis:    1. Wrist pain, left     2. Hand edema     3. Decreased ROM of wrist     4. Decreased  strength of left hand       Referring Physician: Ansley Webster MD  Certification Period:  4/9/18 to 5/24/18  Precautions:  standard  Visits from SOC:  5  Functional Level Prior to SOC:  Independent     Sensation: Gross Sensation Intact      Edema: Circumferential measurements: In centimters       Left   MPs 17.0 cm (=)   PPC (Proximal Smith Crease) 20. cm (-.3)   PWC (Proximal Wrist Crease) 16.0 cm (-.8)         Range of Motion:      Wrist ROM: Left  Active    4/9/2018 5/15/18   Flexion 30 45 (+15)   Extension 20 50 (+30)   Radial Deviation 20 35 (+15)   Ulnar Deviation 20 25 (+5)   Supination 90 90 (=)   Pronation 75 80 (+5)          Strength: (LAUREN Dynamometer #2 in lbs.) Average 3 trials, Position II:       4/9/2018 4/9/2018     Left Right   Rung II  59 (+20) 70 (+22)          Pinch Strength (Measured in psi)       4/9/2018 4/9/2018     Left Right   Key Pinch  17psi (+6) 20 psi (+7)   3pt Pinch 12psi (+4) 17 psi (+2)   2pt Pinch 7 (+2) 11 psi (+2)          Updated Assessment:  Since beginning OT, pt has made good, steady progress with his OT treatments and has worked hard towards all of his OT goals as evidenced by subjective and objective improvements. Despite these improvements,He  remains with deficits with LUE /pinch strength and endurance, and will continue to benefit from skilled OT consisting of Paraffin, Fluidotherapy, manual therapy/joint mobilization, Therapeutic exercises, Therapeutic activities, Scar management and Joint protection to address remaining limitations and increase functional mobility.    Goals:    Short Term goals: 4/30/18  1)   Patient to be IND with HEP and modalities for pain management -Met  2)   Increase ROM 10 degrees in wrist  flexion plane of motion to increase functional hand use for carrying objects -Met  3)   Increase ROM 10 degrees in wrist extension plane of motion to increase functional hand use for turning steering wheel. 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 -Met    Long Term goals: Discharge  1)   Pt will report 0 out of 10 pain with functional activity. -Met  2)   Patient to score at 62% or more on FOTO to demonstrate improved perception of functional hand use. -Met  3)   Pt will return to prior level of function for ADLs and household management. -Progressing  New Goals:   By discharge, pt will:    1) be able to carry 50lbs at waist level for 10 yards without pain using both UE's to prepare for return to work    2) be able to transfer 20lbs from floor to average table height (28in) without pain using both UE's to prepare for return to work    3) be able to hold and maintain a 5lb weight with the LUE at shoulder level for 1 minutes to improve anaerobic threshold for return to work      4) increase AROM of L wrist flexion by 5-10 degrees to increase functional use of LUE in preparation for return to work    5) assess muscle strength as evidenced by MMT    Reasons for Recertification of Therapy: Decreased ROM, Decreased  strength, Decreased pinch strength, Decreased muscle strength, Decreased functional hand use and Joint Stiffness  Certification Period: 5/24/18 to 6/24/18  Recommended Treatment Plan: 1 times per week for 4 weeks:   Other Recommendations: Paraffin, Fluidotherapy, Manual therapy/joint mobilizations, Therapeutic exercises/activities., Strengthening, Scar Management and Joint Protection            I CERTIFY THE NEED FOR THESE SERVICES FURNISHED UNDER THIS PLAN OF TREATMENT AND WHILE UNDER MY CARE    Physician's comments:  ____________________________________________________________________________________________________________________________________________    Physician's Name: ___________________________________

## 2018-05-15 NOTE — PROGRESS NOTES
Occupational Therapy Daily Treatment Note     Name: Alfie Cordova  MRN: [id number]  Physician: Ansley Webster MD  Diagnosis:   Encounter Diagnoses   Name Primary?    Wrist pain, left     Hand edema     Decreased ROM of wrist     Decreased  strength of left hand        Visit Date: 5/15/2018  Visit #: 5 / 12  Authorization period Expiration: 12/31/18  Plan of Care Expiration: 5/24/18  Precautions: Standard  Date of Return to MD: About 2 months, per patient he is unsure of exact date     Time In: 11am  Time Out: 12am  Total 1:1 Treatment Time: 60 min    Subjective     Pt reports: Minor stiffness this morning  Pain Scale:  0/10 on VAS currently.   Pain Location: Left dorsal wrist  Response to previous treatment: Pt reports a minor decrease in pain as well as increased joint mobility following previous tx    Objective     5 cm with scar on dorsal side of wrist      Sensation: Gross Sensation Intact      Edema: Circumferential measurements: In centimters       Left   MPs 17.0 cm (=)   PPC (Proximal Smith Crease) 20. cm (-.3)   PWC (Proximal Wrist Crease) 16.0 cm (-.8)         Range of Motion:      Wrist ROM: Left  Active    4/9/2018 5/15/18   Flexion 30 45 (+15)   Extension 20 50 (+30)   Radial Deviation 20 35 (+15)   Ulnar Deviation 20 25 (+5)   Supination 90 90 (=)   Pronation 75 80 (+5)          Strength: (LAUREN Dynamometer #2 in lbs.) Average 3 trials, Position II:       4/9/2018 4/9/2018     Left Right   Rung II  59 (+20) 70 (+22)          Pinch Strength (Measured in psi)       4/9/2018 4/9/2018     Left Right   Key Pinch  17psi (+6) 20 psi (+7)   3pt Pinch 12psi (+4) 17 psi (+2)   2pt Pinch 7 (+2) 11 psi (+2)         Outcome Measure: FOTO     Current Score  = 55% or 61% impaired  Goal at Discharge Score = 62% or 38% impaired      Adam received the following supervised modalities after being cleared for contradictions:   -Patient received paraffin bath to L  hand(s) for 10 minutes to increase blood flow, circulation, pain management and for tissue elasticity prior to therex.        Adam received the following manual therapy techniques:   -Scar extraction x 5 minutes prior to scar massage.   -Performed scar massage to L dorsal wrist area for 10 minutes with and without mini vibrator to decrease adhesions and improve tensile glide.       Adam received therapeutic exercises to develop ROM and flexibility for 15 minutes including:  -LLPS over wedge with wrist in flexion with 4lb weight  -Performed 3lb free weight for wrist flexion/extension (2 positions), 2 x 15 reps each for wrist strengthening.       Home Exercises and Education Provided     Education provided: Scar tissue formation and mobilization to improve ROM   - Progress towards goals   - Role of therapy, goals for therapy  No spiritual or educational barriers to learning provided    Written Home Exercises Provided: Continue HEP, emphasis on scar mobilization.   Exercises were reviewed and Adam was able to demonstrate them prior to the end of the session.   Pt received a written copy of exercises to perform at home. Adam demonstrated good  understanding of the education provided.    Assessment     Pt demonstrating increased ROM and muscle strength as evidence by global improvements in AROM and /pinch strength measurements. Following all therapeutic techniques he has visibly increased motion in plane of flexion/extension & radial/ulnar deviation. He has met 7/8 goals with a primary focus on ROM, however new goals updated today with more focus on strengthening and return to work.  Adam is progressing well towards his goals but still has not reached MMI. Pt would continue to benefit from skilled OT for strengthening and work conditioning. Pt prognosis is Good.     Medical necessity:  Patient continues to demonstrate limitation with  ROM, Joint mobility, Stiffness, Decreased fine motor coordination, Decreased  functional use, Continued pain and Scar Tissue formation all of which interfere with pt's independent vocational and leisurely pursuits.     Anticipated barriers to occupational therapy: Slight language barrier   Pt's spiritual, cultural and educational needs considered and pt agreeable to plan of care and goals.    Short Term goals: 4/30/18  1)   Patient to be IND with HEP and modalities for pain management -Met  2)   Increase ROM 10 degrees in wrist flexion plane of motion to increase functional hand use for carrying objects -Met  3)   Increase ROM 10 degrees in wrist extension plane of motion to increase functional hand use for turning steering wheel. 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 -Met    Long Term goals: Discharge  1)   Pt will report 0 out of 10 pain with functional activity. -Met  2)   Patient to score at 62% or more on FOTO to demonstrate improved perception of functional hand use. -Met  3)   Pt will return to prior level of function for ADLs and household management. -Progressing    Plan   Continue with established Plan of Care towards Occupational Therapy goals.   Discussed Plan of Care with patient: Yes  Updates/Grading for next session: Putty - yellow and R/A ROM; progressive resistance training and simulated work hardening as tolerated   New Goals:   By discharge, pt will:    1) be able to carry 50lbs at waist level for 10 yards without pain using both UE's to prepare for return to work    2) be able to transfer 20lbs from floor to average table height (28in) without pain using both UE's to prepare for return to work    3) be able to hold and maintain a 5lb weight with the LUE at shoulder level for 1 minutes to improve anaerobic threshold for return to work      4) increase AROM of L wrist flexion by 5-10 degrees to increase  "functional use of LUE in preparation for return to work    5) assess muscle strength as evidenced by MMT  Student: BLANCA Vail    " I certify that I was present in the room directing the student in service delivery and guiding them using my skilled judgement. As the co-signing therapist, I have reviewed the student's documentation and am responsible for the treatment, assessment and plan."    Therapist: SJ Jean  "

## 2018-05-22 ENCOUNTER — CLINICAL SUPPORT (OUTPATIENT)
Dept: REHABILITATION | Facility: HOSPITAL | Age: 33
End: 2018-05-22
Payer: MEDICAID

## 2018-05-22 DIAGNOSIS — M25.639 DECREASED ROM OF WRIST: ICD-10-CM

## 2018-05-22 DIAGNOSIS — M25.532 WRIST PAIN, LEFT: ICD-10-CM

## 2018-05-22 DIAGNOSIS — R60.0 HAND EDEMA: ICD-10-CM

## 2018-05-22 DIAGNOSIS — R29.898 DECREASED GRIP STRENGTH OF LEFT HAND: ICD-10-CM

## 2018-05-22 PROCEDURE — 97530 THERAPEUTIC ACTIVITIES: CPT | Mod: PO

## 2018-05-22 NOTE — PROGRESS NOTES
Occupational Therapy Daily Treatment Note     Name: Alfie Cordova  MRN: [id number]  Physician: Ansley Webster MD  Diagnosis:   Encounter Diagnoses   Name Primary?    Wrist pain, left     Hand edema     Decreased ROM of wrist     Decreased  strength of left hand        Visit Date: 5/22/2018  Visit #: 6 / 12  Authorization period Expiration: 12/31/18  Plan of Care Expiration: 6/24/18  Precautions: Standard  Date of Return to MD: About 2 months, per patient he is unsure of exact date     Time In: 11am  Time Out: 12pm  Total 1:1 Treatment Time: 45 min    Subjective     Pt reports: increase in movement following previous tx; pt states he is reluctant to resume full use of left hand  Pain Scale:  0/10 on VAS currently.   Pain Location: Left dorsal wrist  Response to previous treatment: Pt reports increased joint mobility following previous and no adverse effects from strengthening    Objective       Adam received the following supervised modalities for 10 minutes after being cleared for contradictions:   -Patient received paraffin bath to L hand(s) for 10 minutes to increase blood flow, circulation, pain management and for tissue elasticity prior to therex.        Adam received the following manual therapy techniques for 15 minutes:   -Scar extraction x 5 minutes prior llps  -Pt performed LLPS in wrist flexion over wedge with moist heat for 10 minutes to increase joint mobility and to decrease scar adhesions     Adam received therapeutic exercises to develop ROM and flexibility for 30 minutes including:  -LLPS over wedge with wrist in flexion with 4lb weight  -Pt performed 4lb free weight  wrist flexion/extension, sup/pron, and RD/UD , 3 x 10 reps each for wrist strengthening.   -Pt performed 20 lb weight transfer from floor to waist level table, 3 sets of 10 reps to simulate work task  -Pt performed wrist roll over pink putty for 2 mins to strengthen wrist supination and  pronation  -Pt performed blue christi bar twists for flex/ext, 3 sets of 10 reps for each direction with a 3 second hold at end range of each rep      Home Exercises and Education Provided     Education provided: Scar tissue formation and mobilization to improve ROM   - Progress towards goals   - Role of therapy, goals for therapy  No spiritual or educational barriers to learning provided    Written Home Exercises Provided: Continue HEP, emphasis on scar mobilization.   Exercises were reviewed and Adam was able to demonstrate them prior to the end of the session.   Pt received a written copy of exercises to perform at home. Adam demonstrated good  understanding of the education provided.    Assessment     Pt demonstrating increased ROM and muscle strength as evidence by global improvements in AROM and /pinch strength measurements. Following all therapeutic techniques he has visibly increased motion in plane of flexion/extension & radial/ulnar deviation. Pt is tolerating progression of strengthening and conditioning with no complaints of pain, soreness, or swelling.  Adam is progressing well towards his goals but still has not reached MMI. Pt would continue to benefit from skilled OT for strengthening and work conditioning. Pt prognosis is Good.     Medical necessity:  Patient continues to demonstrate limitation with  ROM, Joint mobility, Stiffness, Decreased fine motor coordination, Decreased functional use, Continued pain and Scar Tissue formation all of which interfere with pt's independent vocational and leisurely pursuits.     Anticipated barriers to occupational therapy: Slight language barrier   Pt's spiritual, cultural and educational needs considered and pt agreeable to plan of care and goals.    Short Term goals: 4/30/18  1)   Patient to be IND with HEP and modalities for pain management -Met  2)   Increase ROM 10 degrees in wrist flexion plane of motion to increase functional hand use for carrying  "objects -Met  3)   Increase ROM 10 degrees in wrist extension plane of motion to increase functional hand use for turning steering wheel. 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 -Met    Long Term goals: Discharge  1)   Pt will report 0 out of 10 pain with functional activity. -Met  2)   Patient to score at 62% or more on FOTO to demonstrate improved perception of functional hand use. -Met  3)   Pt will return to prior level of function for ADLs and household management. -Progressing    Plan   Continue with established Plan of Care towards Occupational Therapy goals.   Discussed Plan of Care with patient: Yes  Updates/Grading for next session: Putty - yellow and R/A ROM; progressive resistance training and simulated work hardening as tolerated   New Goals:   By discharge, pt will:    1) be able to carry 50lbs at waist level for 10 yards without pain using both UE's to prepare for return to work    2) be able to transfer 20lbs from floor to average table height (28in) without pain using both UE's to prepare for return to work    3) be able to hold and maintain a 5lb weight with the LUE at shoulder level for 1 minutes to improve anaerobic threshold for return to work      4) increase AROM of L wrist flexion by 5-10 degrees to increase functional use of LUE in preparation for return to work    5) assess muscle strength as evidenced by MMT    Student: BLANCA Vail    " I certify that I was present in the room directing the student in service delivery and guiding them using my skilled judgement. As the co-signing therapist, I have reviewed the student's documentation and am responsible for the treatment, assessment and plan."    Therapist: SJ Jean  "

## 2018-06-05 ENCOUNTER — CLINICAL SUPPORT (OUTPATIENT)
Dept: REHABILITATION | Facility: HOSPITAL | Age: 33
End: 2018-06-05
Payer: MEDICAID

## 2018-06-05 DIAGNOSIS — R29.898 DECREASED GRIP STRENGTH OF LEFT HAND: ICD-10-CM

## 2018-06-05 DIAGNOSIS — M25.639 DECREASED ROM OF WRIST: ICD-10-CM

## 2018-06-05 DIAGNOSIS — M25.532 WRIST PAIN, LEFT: ICD-10-CM

## 2018-06-05 DIAGNOSIS — R60.0 HAND EDEMA: ICD-10-CM

## 2018-06-05 PROCEDURE — 97530 THERAPEUTIC ACTIVITIES: CPT | Mod: PO

## 2018-06-05 NOTE — PLAN OF CARE
Outpatient Therapy Discharge Summary     Name: Alfie Cordova  Clinic Number: [id number]    Therapy Diagnosis:   Encounter Diagnoses   Name Primary?    Wrist pain, left     Hand edema     Decreased ROM of wrist     Decreased  strength of left hand      Physician: Ansley Webster MD    Physician Orders: Eval and Treat  Medical Diagnosis: Ganglion Cyst  Evaluation Date: 4/9/18      Date of Last visit: 6/5/18  Total Visits Received: 8  Cancelled Visits: 3  No Show Visits: 1    FOTO Score:  Intial      (4/9/18): 39% or 61% impaired  Current (6/5/18):  74% or 26% impaired  Discharge Goal:  62% or 38% impaired    Assessment    Goals:   Short Term goals: 4/30/18  1)   Patient to be IND with HEP and modalities for pain management -Met  2)   Increase ROM 10 degrees in wrist flexion plane of motion to increase functional hand use for carrying objects -Met  3)   Increase ROM 10 degrees in wrist extension plane of motion to increase functional hand use for turning steering wheel. 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 -Met  5)   Decrease edema .2-.3 cm to increase joint mobility /flexibility for improved overall functional hand use. -Met     Long Term goals: Discharge  1)   Pt will report 0 out of 10 pain with functional activity. -Met  2)   Patient to score at 62% or more on FOTO to demonstrate improved perception of functional hand use. -Met  3)   Pt will return to prior level of function for ADLs and household management. -Met  4) be able to carry 50lbs at waist level for 10 yards without pain using both UE's to prepare for return to work -Met  5) be able to transfer 20lbs from floor to average table height (28in) without pain using both UE's to prepare for return to work -Met    Discharge reason: Patient has met all of his/her goals    Plan   This patient is discharged from Occupational Therapy.

## 2018-06-05 NOTE — PROGRESS NOTES
Occupational Therapy Daily Treatment Note     Name: Alfie Cordova  MRN: [id number]  Physician: Ansley Webster MD  Diagnosis:   Encounter Diagnoses   Name Primary?    Wrist pain, left     Hand edema     Decreased ROM of wrist     Decreased  strength of left hand        Visit Date: 6/5/2018  Visit #: 8 / 12  Authorization period Expiration: 12/31/18  Plan of Care Expiration: 6/24/18  Precautions: Standard  Date of Return to MD: About 2 months, per patient he is unsure of exact date     Time In: 11:15 am  Time Out: 12 pm  Total 1:1 Treatment Time: 45 min    Subjective     Pt reports: Pt states that he feels like he is ready to return to full activity.  Pain Scale:  0/10 on VAS currently.   Response to previous treatment: Pt reports compliance with HEP; no adverse effects following tx or HEP    Objective     FOTO Score:  Intial      (4/9/18): 39% or 61% impaired  Current (6/5/18):  74% or 26% impaired  Discharge Goal:  62% or 38% impaired    Adam received the following manual therapy techniques for 8 minutes:   -Patient received paraffin bath to L hand(s) for 8 minutes to increase blood flow, circulation, pain management and for tissue elasticity prior to therex.     Adam received therapeutic exercises to develop ROM and flexibility for 37 minutes including:  -Pt performed wrist well exercise for concentric flexion with eccentric extension followed by concentric extension with eccentric flexion with 4 lb weight for 3 trials of 5 reps for both motions (1 rep counting as going from the floor to the bar and back down)  -Pt performed ARROM for pronation then supination with hammer grasped at end of shaft for 3 sets of 15 reps for both movements  -Pt performed ARROM for R/U deviation with 3lb weight for 3 sets of 15 reps  -Pt performed wrist roll over pink putty for pronation then supination for 3 sets of 10 reps  -Pt performed 100# PHG for 3 trials moving large pegs (moving  pegs across and back x2 counting as 1 trial)      Home Exercises and Education Provided     Education provided: Scar tissue formation and mobilization to improve ROM   - Progress towards goals   - Role of therapy, goals for therapy  No spiritual or educational barriers to learning provided    Written Home Exercises Provided: Continue HEP, emphasis on scar mobilization.   Exercises were reviewed and Adam was able to demonstrate them prior to the end of the session.   Pt received a written copy of exercises to perform at home. Adam demonstrated good  understanding of the education provided.    Assessment     Pt has made excellent progress over the course of treatment as evidement by measurements taken during previous tx.  Pt demonstrates with no pain at rest or during activity. Pt has been able to tolerate all increases in strength and conditioning exercises during tx without pain, soreness, or inflammation. At this point, pt has met all goals therapy goals and subjectively states that he is ready to full activity.  Pt prognosis is excellent and the patient is acceptable and agreeable for discharge at this point.    Anticipated barriers to occupational therapy: Slight language barrier   Pt's spiritual, cultural and educational needs considered and pt agreeable to plan of care and goals.    Short Term goals: 4/30/18  1)   Patient to be IND with HEP and modalities for pain management -Met  2)   Increase ROM 10 degrees in wrist flexion plane of motion to increase functional hand use for carrying objects -Met  3)   Increase ROM 10 degrees in wrist extension plane of motion to increase functional hand use for turning steering wheel. 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 -Met    Long Term goals: Discharge  1)   Pt will report 0 out of 10 pain with  "functional activity. -Met  2)   Patient to score at 62% or more on FOTO to demonstrate improved perception of functional hand use. -Met  3)   Pt will return to prior level of function for ADLs and household management. -Met  4) be able to carry 50lbs at waist level for 10 yards without pain using both UE's to prepare for return to work -Met  5) be able to transfer 20lbs from floor to average table height (28in) without pain using both UE's to prepare for return to work -Met    Plan   Continue with established Plan of Care towards Occupational Therapy goals.   Discussed Plan of Care with patient: Yes  Updates/Grading for next session: Discharge      Student: BLANCA Vail    " I certify that I was present in the room directing the student in service delivery and guiding them using my skilled judgement. As the co-signing therapist, I have reviewed the student's documentation and am responsible for the treatment, assessment and plan."    Therapist: SJ Jean  "

## 2023-01-15 ENCOUNTER — HOSPITAL ENCOUNTER (EMERGENCY)
Age: 38
Discharge: HOME | End: 2023-01-15
Attending: EMERGENCY MEDICINE
Payer: MEDICAID

## 2023-01-15 VITALS
DIASTOLIC BLOOD PRESSURE: 81 MMHG | BODY MASS INDEX: 23.54 KG/M2 | WEIGHT: 150 LBS | HEART RATE: 78 BPM | RESPIRATION RATE: 16 BRPM | HEIGHT: 67 IN | OXYGEN SATURATION: 100 % | SYSTOLIC BLOOD PRESSURE: 123 MMHG | TEMPERATURE: 98.4 F

## 2023-01-15 DIAGNOSIS — B34.9 VIRAL SYNDROME: Primary | ICD-10-CM

## 2023-01-15 LAB
ANION GAP SERPL CALC-SCNC: 6 MMOL/L (ref 3–18)
BASOPHILS # BLD: 0 K/UL (ref 0–0.1)
BASOPHILS NFR BLD: 0 % (ref 0–2)
BUN SERPL-MCNC: 10 MG/DL (ref 7–18)
BUN/CREAT SERPL: 10 (ref 12–20)
CALCIUM SERPL-MCNC: 8.8 MG/DL (ref 8.5–10.1)
CHLORIDE SERPL-SCNC: 104 MMOL/L (ref 100–111)
CO2 SERPL-SCNC: 29 MMOL/L (ref 21–32)
CREAT SERPL-MCNC: 1.05 MG/DL (ref 0.6–1.3)
DIFFERENTIAL METHOD BLD: ABNORMAL
EOSINOPHIL # BLD: 0 K/UL (ref 0–0.4)
EOSINOPHIL NFR BLD: 0 % (ref 0–5)
ERYTHROCYTE [DISTWIDTH] IN BLOOD BY AUTOMATED COUNT: 13.1 % (ref 11.6–14.5)
FLUAV RNA SPEC QL NAA+PROBE: NOT DETECTED
FLUBV RNA SPEC QL NAA+PROBE: NOT DETECTED
GLUCOSE SERPL-MCNC: 103 MG/DL (ref 74–99)
HCT VFR BLD AUTO: 48.4 % (ref 36–48)
HGB BLD-MCNC: 16.1 G/DL (ref 13–16)
IMM GRANULOCYTES # BLD AUTO: 0 K/UL (ref 0–0.04)
IMM GRANULOCYTES NFR BLD AUTO: 0 % (ref 0–0.5)
LYMPHOCYTES # BLD: 1.8 K/UL (ref 0.9–3.6)
LYMPHOCYTES NFR BLD: 28 % (ref 21–52)
MCH RBC QN AUTO: 28 PG (ref 24–34)
MCHC RBC AUTO-ENTMCNC: 33.3 G/DL (ref 31–37)
MCV RBC AUTO: 84.3 FL (ref 78–100)
MONOCYTES # BLD: 1 K/UL (ref 0.05–1.2)
MONOCYTES NFR BLD: 16 % (ref 3–10)
NEUTS SEG # BLD: 3.7 K/UL (ref 1.8–8)
NEUTS SEG NFR BLD: 56 % (ref 40–73)
NRBC # BLD: 0 K/UL (ref 0–0.01)
NRBC BLD-RTO: 0 PER 100 WBC
PLATELET # BLD AUTO: 165 K/UL (ref 135–420)
PLATELET COMMENTS,PCOM: ABNORMAL
PMV BLD AUTO: 10.6 FL (ref 9.2–11.8)
POTASSIUM SERPL-SCNC: 3.8 MMOL/L (ref 3.5–5.5)
RBC # BLD AUTO: 5.74 M/UL (ref 4.35–5.65)
RBC MORPH BLD: ABNORMAL
SARS-COV-2, COV2: NOT DETECTED
SODIUM SERPL-SCNC: 139 MMOL/L (ref 136–145)
WBC # BLD AUTO: 6.5 K/UL (ref 4.6–13.2)

## 2023-01-15 PROCEDURE — 85025 COMPLETE CBC W/AUTO DIFF WBC: CPT

## 2023-01-15 PROCEDURE — 87636 SARSCOV2 & INF A&B AMP PRB: CPT

## 2023-01-15 PROCEDURE — 74011250637 HC RX REV CODE- 250/637: Performed by: EMERGENCY MEDICINE

## 2023-01-15 PROCEDURE — 80048 BASIC METABOLIC PNL TOTAL CA: CPT

## 2023-01-15 PROCEDURE — 99283 EMERGENCY DEPT VISIT LOW MDM: CPT

## 2023-01-15 RX ORDER — IBUPROFEN 600 MG/1
600 TABLET ORAL
Qty: 20 TABLET | Refills: 0 | Status: SHIPPED | OUTPATIENT
Start: 2023-01-15

## 2023-01-15 RX ORDER — BUTALBITAL, ACETAMINOPHEN AND CAFFEINE 50; 325; 40 MG/1; MG/1; MG/1
1 TABLET ORAL
Status: COMPLETED | OUTPATIENT
Start: 2023-01-15 | End: 2023-01-15

## 2023-01-15 RX ORDER — BUTALBITAL, ACETAMINOPHEN AND CAFFEINE 300; 40; 50 MG/1; MG/1; MG/1
1 CAPSULE ORAL
Qty: 15 CAPSULE | Refills: 0 | Status: SHIPPED | OUTPATIENT
Start: 2023-01-15

## 2023-01-15 RX ORDER — IBUPROFEN 600 MG/1
600 TABLET ORAL
Status: COMPLETED | OUTPATIENT
Start: 2023-01-15 | End: 2023-01-15

## 2023-01-15 RX ADMIN — IBUPROFEN 600 MG: 600 TABLET, FILM COATED ORAL at 04:23

## 2023-01-15 RX ADMIN — BUTALBITAL, ACETAMINOPHEN, AND CAFFEINE 1 TABLET: 50; 325; 40 TABLET ORAL at 04:23

## 2023-01-15 NOTE — ED TRIAGE NOTES
Pt CC of bilateral eye irritation, HA, Abd pain. Pt just got back to [address] from a plane ride yesterday, was coming from [facility]. Denies N/V/D/C, CP/SOB. Time of onset: noon yesterday   Treatment PTA: denies     Pt ambulated independently  A&Ox4  Speaking in full sentences clearly  Respirations even and unlabored  Skin pink/warm/dry/intact  Patient behavior: Sitting comfortably on cot, no distress noted.

## 2023-01-20 NOTE — ED PROVIDER NOTES
EMERGENCY DEPARTMENT HISTORY AND PHYSICAL EXAM      Date: 1/15/2023  Patient Name: [facility]    History of Presenting Illness     Chief Complaint   Patient presents with    Headache       History Provided By: Patient    HPI: Chano Gamez, 44 y.o. male with PMHx as noted below presents the emergency department with chief complaint of chills, body aches, cough, nasal congestion, mild frontal headache as well as exacerbation of his acid reflux. Patient states that he recently traveled to [facility], began feeling unwell yesterday. Symptoms are constant, mild in intensity. He denies any chest pain or shortness of breath. Denies any lower extremity pain/swelling. Also denying any abdominal pain at this time. PCP: Xochilt Zavaleta MD    Current Outpatient Medications   Medication Sig Dispense Refill    butalbital-acetaminophen-caff (Fioricet) -40 mg per capsule Take 1 Capsule by mouth every four (4) hours as needed for Headache. 15 Capsule 0    ibuprofen (MOTRIN) 600 mg tablet Take 1 Tablet by mouth every six (6) hours as needed for Pain. 20 Tablet 0    ondansetron (Zofran ODT) 4 mg disintegrating tablet Take 1 Tablet by mouth every eight (8) hours as needed for Nausea. (Patient not taking: Reported on 12/10/2022) 12 Tablet 0       Past History     Past Medical History:  Past Medical History:   Diagnosis Date    GERD (gastroesophageal reflux disease)        Past Surgical History:  History reviewed. No pertinent surgical history. Family History:  History reviewed. No pertinent family history.     Social History:  Social History     Tobacco Use    Smoking status: Never    Smokeless tobacco: Never   Substance Use Topics    Alcohol use: Not Currently    Drug use: Never       Allergies:  No Known Allergies      Review of Systems   Review of Systems  Pertinent positives and negatives in HPI  Physical Exam   Physical Exam    GENERAL: alert and oriented, no acute distress  EYES: PEERL, No injection, discharge or icterus. ENT: Mucous membranes pink and moist.  NECK: Supple  LUNGS: Airway patent. Non-labored respirations. Breath sounds clear with good air entry bilaterally. HEART: Regular rate and rhythm. No peripheral edema  ABDOMEN: Non-distended and non-tender, without guarding or rebound. SKIN:  warm, dry  MSK/EXTREMITIES: Without swelling, tenderness or deformity, symmetric with normal ROM  NEUROLOGICAL: Alert, oriented      Diagnostic Study Results     Labs -   No results found for this or any previous visit (from the past 12 hour(s)). Radiologic Studies -   No orders to display     CT Results  (Last 48 hours)      None          CXR Results  (Last 48 hours)      None              Medical Decision Making       I reviewed the vital signs, available nursing notes, past medical history, past surgical history, family history and social history. Vital Signs-Reviewed the patient's vital signs. Provider Notes (Medical Decision Making): On presentation, the patient is well appearing, in no acute distress with normal vital signs. Based on my history and exam the differential diagnosis for this patient includes viral syndrome, pneumonia, bronchitis, tension headache. Patient with no red flag signs or symptoms that would warrant neuroimaging at this time as his headache is very mild, gradual in onset, history of similar headaches. Patient's constellation of symptoms to be most consistent with a likely viral illness. We have no reason to suspect PE, vital signs not suggestive of this diagnosis and neither are his symptoms. Basic labs showed no significant derangements on my interpretation. COVID and flu testing were negative on my interpretation patient was given some Motrin as well as a Fioricet with improvement in symptoms. Felt appropriate for outpatient management. ED Course:   Initial assessment performed.  The patients presenting problems have been discussed, and they are in agreement with the care plan formulated and outlined with them. I have encouraged them to ask questions as they arise throughout their visit. Patient was given the following medications:  Medications   butalbital-acetaminophen-caffeine (FIORICET, ESGIC) -40 mg per tablet 1 Tablet (1 Tablet Oral Given 1/15/23 0423)   ibuprofen (MOTRIN) tablet 600 mg (600 mg Oral Given 1/15/23 0423)          PROGRESS  Nicole Brown's  results have been reviewed with him. He has been counseled regarding his diagnosis. He verbally conveys understanding and agreement of the signs, symptoms, diagnosis, treatment and prognosis and additionally agrees to follow up as recommended with Dr. Niki Santa MD in 24 - 48 hours. He also agrees with the care-plan and conveys that all of his questions have been answered. I have also put together some discharge instructions for him that include: 1) educational information regarding their diagnosis, 2) how to care for their diagnosis at home, as well a 3) list of reasons why they would want to return to the ED prior to their follow-up appointment, should their condition change. Disposition:  home    PLAN:  1. Discharge Medication List as of 1/15/2023  7:41 AM        START taking these medications    Details   butalbital-acetaminophen-caff (Fioricet) -40 mg per capsule Take 1 Capsule by mouth every four (4) hours as needed for Headache., Normal, Disp-15 Capsule, R-0      ibuprofen (MOTRIN) 600 mg tablet Take 1 Tablet by mouth every six (6) hours as needed for Pain., Normal, Disp-20 Tablet, R-0           CONTINUE these medications which have NOT CHANGED    Details   ondansetron (Zofran ODT) 4 mg disintegrating tablet Take 1 Tablet by mouth every eight (8) hours as needed for Nausea., Normal, Disp-12 Tablet, R-0           2.    Follow-up Information       Follow up With Specialties Details Why Contact Info    Niki Santa MD Internal Medicine Physician Schedule an appointment as soon as possible for a visit in 2 days  [address] [facility]      [facility] EMERGENCY DEPT Emergency Medicine  If symptoms worsen [address] [place] [address]  [phone]          Return to ED if worse     Diagnosis     Clinical Impression:   1. Viral syndrome          I, Javi Washington MD am the first provider for this patient and am the attending of record for this patient encounter. Javi Washington MD        Please note that this dictation was completed with Dragon, computer voice recognition software. Quite often unanticipated grammatical, syntax, homophones, and other interpretive errors are inadvertently transcribed by the computer software. Please disregard these errors. Additionally, please excuse any errors that have escaped final proofreading.

## 2023-06-17 ENCOUNTER — HOSPITAL ENCOUNTER (EMERGENCY)
Facility: HOSPITAL | Age: 38
Discharge: HOME | End: 2023-06-17
Payer: MEDICAID

## 2023-06-17 VITALS
OXYGEN SATURATION: 98 % | TEMPERATURE: 97.3 F | HEIGHT: 67 IN | BODY MASS INDEX: 24.33 KG/M2 | DIASTOLIC BLOOD PRESSURE: 98 MMHG | SYSTOLIC BLOOD PRESSURE: 150 MMHG | HEART RATE: 89 BPM | WEIGHT: 155 LBS | RESPIRATION RATE: 16 BRPM

## 2023-06-17 DIAGNOSIS — L30.9 DERMATITIS: Primary | ICD-10-CM

## 2023-06-17 PROCEDURE — 99283 EMERGENCY DEPT VISIT LOW MDM: CPT

## 2023-06-17 RX ORDER — METHYLPREDNISOLONE 4 MG/1
TABLET ORAL
Qty: 1 KIT | Refills: 0 | Status: SHIPPED | OUTPATIENT
Start: 2023-06-17 | End: 2023-06-23

## 2023-06-17 ASSESSMENT — PAIN - FUNCTIONAL ASSESSMENT: PAIN_FUNCTIONAL_ASSESSMENT: NONE - DENIES PAIN

## 2023-06-17 NOTE — DISCHARGE INSTRUCTIONS
Your Rash appears to be a type of dermatitis called Nummular Eczema, which will need to be confirmed by a dermatologist.     Recommendations:  ?Limit bathing to no more than once daily with lukewarm (not hot) water using mild using Dove Sensitive body wash. ? Apply a moisturizer at least once, preferably twice, daily and immediately after bathing. ? Use laundry detergents for sensitive skin, which have the lowest potential for skin irritancy, and/or double rinse the laundry to minimize residual laundry detergent in clothing.

## 2023-06-17 NOTE — ED PROVIDER NOTES
[facility] EMERGENCY DEPT  EMERGENCY DEPARTMENT ENCOUNTER       Pt Name: Henrik Crump  MRN: [id number]  Latoyagfjd 1985  Date of evaluation: 6/17/2023  PCP: Konrad Soto MD  Note Started: 5:19 PM 6/17/23     CHIEF COMPLAINT       Chief Complaint   Patient presents with    Rash        HISTORY OF PRESENT ILLNESS: 1 or more elements      History From: Patient  HPI Limitations: None  Chronic Conditions: none  Social Determinants affecting Dx or Tx: none      Benjiudys Red Mccall is a 44 y.o. male who presents to ED c/o slightly itchy rash which started to bilateral forearms and spread to his legs and some on his back about 1 month ago. Seen by PCP on 5/25/2023 and was prescribed Lotrimin, triamcinolone cream and a Medrol Dosepak, which seemed to briefly improve his symptoms but did not resolve. Several days prior to that he took 3 days of azithromycin for a URI and thought it could have been related to medication reaction, though he has no history of any medication reactions. No one else in the household has similar reaction. He has noticed that since going back to work at the [employer] where he works as a  and states since he sweats more the rash seems to have worsened, particularly in his legs. He has an appoint with dermatology in 1 month in [place]. He denies any new soaps, lotions, detergents, rash on hands or feet. , fever, joint pain, known insect bites. Nursing Notes were all reviewed and agreed with or any disagreements were addressed in the HPI. PAST HISTORY     Past Medical History:  Past Medical History:   Diagnosis Date    GERD (gastroesophageal reflux disease)        Past Surgical History:  History reviewed. No pertinent surgical history. Family History:  History reviewed. No pertinent family history.     Social History:  Social History     Socioeconomic History    Marital status: Single     Spouse name: None    Number of children: None    Years of education: None

## 2024-11-05 ENCOUNTER — APPOINTMENT (OUTPATIENT)
Facility: HOSPITAL | Age: 39
End: 2024-11-05
Payer: MEDICAID

## 2024-11-05 ENCOUNTER — HOSPITAL ENCOUNTER (EMERGENCY)
Facility: HOSPITAL | Age: 39
Discharge: HOME | End: 2024-11-05
Payer: MEDICAID

## 2024-11-05 VITALS
WEIGHT: 156 LBS | DIASTOLIC BLOOD PRESSURE: 92 MMHG | RESPIRATION RATE: 18 BRPM | TEMPERATURE: 97.7 F | HEART RATE: 79 BPM | SYSTOLIC BLOOD PRESSURE: 124 MMHG | OXYGEN SATURATION: 100 % | HEIGHT: 67 IN | BODY MASS INDEX: 24.48 KG/M2

## 2024-11-05 DIAGNOSIS — S56.911A FOREARM STRAIN, RIGHT, INITIAL ENCOUNTER: Primary | ICD-10-CM

## 2024-11-05 PROCEDURE — 99283 EMERGENCY DEPT VISIT LOW MDM: CPT

## 2024-11-05 PROCEDURE — 73080 X-RAY EXAM OF ELBOW: CPT

## 2024-11-05 RX ORDER — METHYLPREDNISOLONE 4 MG/1
TABLET ORAL
Qty: 1 KIT | Refills: 0 | Status: SHIPPED | OUTPATIENT
Start: 2024-11-05

## 2024-11-05 ASSESSMENT — PAIN - FUNCTIONAL ASSESSMENT: PAIN_FUNCTIONAL_ASSESSMENT: 0-10

## 2024-11-05 ASSESSMENT — PAIN SCALES - GENERAL: PAINLEVEL_OUTOF10: 9

## 2024-11-05 NOTE — ED PROVIDER NOTES
Interpretation per the Radiologist below, if available at the time of this note:  XR ELBOW RIGHT (MIN 3 VIEWS)   Final Result   No acute abnormality.      Electronically signed by COLUMBA MADISON              PROCEDURES   Unless otherwise noted below, none  Procedures         CRITICAL CARE TIME       EMERGENCY DEPARTMENT COURSE and DIFFERENTIAL DIAGNOSIS/MDM   Vitals:    Vitals:    11/05/24 0734   BP: (!) 124/92   Pulse: 79   Resp: 18   Temp: 97.7 °F (36.5 °C)   SpO2: 100%   Weight: 70.8 kg (156 lb)   Height: 1.702 m (5' 7\")       Patient was given the following medications:  Medications - No data to display        Records Reviewed (source and summary): Old medical records.  Nursing notes.    CLINICAL MANAGEMENT TOOLS:  Not Applicable      ED COURSE       Medial Decision Making:  DDX: tendonitis, bursitis, ligamentous, OA, gout, strain, sprain    Imaging unremarkable. NVI. Doubt DVT given no known risk factors with no appreciable swelling/edema or color change. Suspect soft tissue injury such as tendonitis. Medrol dose audra. PCP or Ortho FU. All questions answered. Pt feels comfortable going home at this time. Pt expressed understanding and he agrees with plan.      FINAL IMPRESSION     1. Forearm strain, right, initial encounter            DISPOSITION/PLAN   DISPOSITION Decision To Discharge 11/05/2024 09:16:12 AM                PATIENT REFERRED TO:  Sarahi Lewis MD  [id number] [facility] [id number] [phone]          Tom Bravo MD  [address] [facility]  [address]  [facility] [id number] [phone]      orthopedist follow up         DISCHARGE MEDICATIONS:     Medication List        START taking these medications      methylPREDNISolone 4 MG tablet  Commonly known as: MEDROL DOSEPACK  Take with food.            ASK your doctor about these medications      butalbital-APAP-caffeine -40 MG Caps per capsule     ondansetron 4 MG disintegrating tablet  Commonly known

## 2024-11-05 NOTE — ED TRIAGE NOTES
Pt sts he has been having right lower arm pain when you press on it for couple of months. Has been seen and given medications for it. No obvious swelling, redness, or bruising. Pt denies any trauma.

## 2025-02-20 ENCOUNTER — APPOINTMENT (OUTPATIENT)
Facility: HOSPITAL | Age: 40
End: 2025-02-20
Payer: MEDICAID

## 2025-02-20 ENCOUNTER — HOSPITAL ENCOUNTER (EMERGENCY)
Facility: HOSPITAL | Age: 40
Discharge: HOME | End: 2025-02-20
Payer: MEDICAID

## 2025-02-20 VITALS
RESPIRATION RATE: 16 BRPM | HEART RATE: 87 BPM | OXYGEN SATURATION: 99 % | TEMPERATURE: 97.5 F | WEIGHT: 154 LBS | DIASTOLIC BLOOD PRESSURE: 99 MMHG | SYSTOLIC BLOOD PRESSURE: 144 MMHG | BODY MASS INDEX: 24.17 KG/M2 | HEIGHT: 67 IN

## 2025-02-20 DIAGNOSIS — S06.0X0A CONCUSSION WITHOUT LOSS OF CONSCIOUSNESS, INITIAL ENCOUNTER: Primary | ICD-10-CM

## 2025-02-20 PROCEDURE — 93005 ELECTROCARDIOGRAM TRACING: CPT

## 2025-02-20 PROCEDURE — 99284 EMERGENCY DEPT VISIT MOD MDM: CPT

## 2025-02-20 PROCEDURE — 70450 CT HEAD/BRAIN W/O DYE: CPT

## 2025-02-20 PROCEDURE — 6370000000 HC RX 637 (ALT 250 FOR IP)

## 2025-02-20 RX ORDER — ACETAMINOPHEN 325 MG/1
650 TABLET ORAL
Status: COMPLETED | OUTPATIENT
Start: 2025-02-20 | End: 2025-02-20

## 2025-02-20 RX ADMIN — ACETAMINOPHEN 650 MG: 325 TABLET ORAL at 14:17
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
## 2025-02-21 LAB
EKG ATRIAL RATE: 97 BPM
EKG DIAGNOSIS: NORMAL
EKG P AXIS: 70 DEGREES
EKG P-R INTERVAL: 142 MS
EKG Q-T INTERVAL: 326 MS
EKG QRS DURATION: 90 MS
EKG QTC CALCULATION (BAZETT): 414 MS
EKG R AXIS: -44 DEGREES
EKG T AXIS: 51 DEGREES
EKG VENTRICULAR RATE: 97 BPM

## 2025-04-20 ENCOUNTER — HOSPITAL ENCOUNTER (EMERGENCY)
Facility: HOSPITAL | Age: 40
Discharge: HOME | End: 2025-04-20
Payer: MEDICAID

## 2025-04-20 VITALS
HEART RATE: 67 BPM | DIASTOLIC BLOOD PRESSURE: 68 MMHG | WEIGHT: 145 LBS | BODY MASS INDEX: 22.76 KG/M2 | OXYGEN SATURATION: 99 % | TEMPERATURE: 98.6 F | HEIGHT: 67 IN | RESPIRATION RATE: 18 BRPM | SYSTOLIC BLOOD PRESSURE: 168 MMHG

## 2025-04-20 DIAGNOSIS — N50.89 GENITAL LESION, MALE: Primary | ICD-10-CM

## 2025-04-20 DIAGNOSIS — R03.0 ELEVATED BLOOD PRESSURE READING WITHOUT DIAGNOSIS OF HYPERTENSION: ICD-10-CM

## 2025-04-20 LAB
APPEARANCE UR: CLEAR
BILIRUB UR QL: NEGATIVE
COLOR UR: YELLOW
GLUCOSE UR STRIP.AUTO-MCNC: NEGATIVE MG/DL
HGB UR QL STRIP: NEGATIVE
KETONES UR QL STRIP.AUTO: NEGATIVE MG/DL
LEUKOCYTE ESTERASE UR QL STRIP.AUTO: NEGATIVE
NITRITE UR QL STRIP.AUTO: NEGATIVE
PH UR STRIP: 7.5 (ref 5–8)
PROT UR STRIP-MCNC: NEGATIVE MG/DL
SP GR UR REFRACTOMETRY: 1.02 (ref 1–1.03)
UROBILINOGEN UR QL STRIP.AUTO: 1 EU/DL (ref 0.2–1)

## 2025-04-20 PROCEDURE — 81003 URINALYSIS AUTO W/O SCOPE: CPT

## 2025-04-20 PROCEDURE — 87591 N.GONORRHOEAE DNA AMP PROB: CPT

## 2025-04-20 PROCEDURE — 87255 GENET VIRUS ISOLATE HSV: CPT

## 2025-04-20 PROCEDURE — 87491 CHLMYD TRACH DNA AMP PROBE: CPT

## 2025-04-20 PROCEDURE — 6370000000 HC RX 637 (ALT 250 FOR IP): Performed by: NURSE PRACTITIONER

## 2025-04-20 PROCEDURE — 87661 TRICHOMONAS VAGINALIS AMPLIF: CPT

## 2025-04-20 PROCEDURE — 99283 EMERGENCY DEPT VISIT LOW MDM: CPT

## 2025-04-20 RX ORDER — ACYCLOVIR 400 MG/1
400 TABLET ORAL 3 TIMES DAILY
Qty: 21 TABLET | Refills: 0 | Status: SHIPPED | OUTPATIENT
Start: 2025-04-20 | End: 2025-04-27

## 2025-04-20 RX ORDER — ACYCLOVIR 200 MG/1
400 CAPSULE ORAL
Status: COMPLETED | OUTPATIENT
Start: 2025-04-20 | End: 2025-04-20

## 2025-04-20 RX ADMIN — ACYCLOVIR 400 MG: 200 CAPSULE ORAL at 21:51

## 2025-04-21 LAB
C TRACH RRNA SPEC QL NAA+PROBE: NEGATIVE
N GONORRHOEA RRNA SPEC QL NAA+PROBE: NEGATIVE
SPECIMEN SOURCE: NORMAL
T VAGINALIS RRNA SPEC QL NAA+PROBE: NEGATIVE

## 2025-04-21 NOTE — ED PROVIDER NOTES
[facility]  EMERGENCY DEPARTMENT ENCOUNTER       Pt Name: Jewel eVra  MRN: [id number]  Birthdate 1985  Date of evaluation: 4/20/2025  PCP: Sarahi Lewis MD  Note Started: 9:50 PM 4/20/25     CHIEF COMPLAINT       Chief Complaint   Patient presents with    Exposure to STD        HISTORY OF PRESENT ILLNESS: 1 or more elements      History From: Patient  HPI Limitations: None  Chronic Conditions: GERD  Social Determinants affecting Dx or Tx: None       Jewel Vera is a 39 y.o. male who presents to ED c/o lesion to penis.  Patient reports it has been present over the last few days.  Patient reports burning and itching.  No known exposure to sexually transmitted diseases, patient denies new sexual partners.  No dysuria, no genital discharge.  No headache, dizziness, vision changes, chest pain or shortness of breath.     Nursing Notes were all reviewed and agreed with or any disagreements were addressed in the HPI.    PAST HISTORY     Past Medical History:  Past Medical History:   Diagnosis Date    GERD (gastroesophageal reflux disease)        Past Surgical History:  No past surgical history on file.    Family History:  No family history on file.    Social History:  Social History     Socioeconomic History    Marital status: Single   Tobacco Use    Smoking status: Never    Smokeless tobacco: Never   Substance and Sexual Activity    Alcohol use: Not Currently    Drug use: Never       Allergies:  No Known Allergies    CURRENT MEDICATIONS      Current Facility-Administered Medications   Medication Dose Route Frequency Provider Last Rate Last Admin    acyclovir (ZOVIRAX) capsule 400 mg  400 mg Oral NOW Berenice Jefferson, APRN - NP         Current Outpatient Medications   Medication Sig Dispense Refill    acyclovir (ZOVIRAX) 400 MG tablet Take 1 tablet by mouth 3 times daily for 7 days 21 tablet 0    methylPREDNISolone (MEDROL DOSEPACK) 4 MG tablet Take with

## 2025-04-21 NOTE — ED TRIAGE NOTES
Patient discharged by KAVITHA Roldan pt sent to the front lobby, with strong and steady gait, no acute distress noted at time of discharge - Discharge information / home RX / and reasons to return to the ED were reviewed by the ED provider.      Patient to ED for reports of lesion to genitalia

## 2025-04-23 LAB
HSV SPEC CULT: NORMAL
SPECIMEN SOURCE: NORMAL

## 2025-07-12 NOTE — ED TRIAGE NOTES
Patient reports \" a rash on left ar and right leg x1 month. \" Patient is alert and oriented x4, respirations are wnl, nad noted at triage. No